# Patient Record
Sex: FEMALE | Race: WHITE | NOT HISPANIC OR LATINO | Employment: UNEMPLOYED | URBAN - METROPOLITAN AREA
[De-identification: names, ages, dates, MRNs, and addresses within clinical notes are randomized per-mention and may not be internally consistent; named-entity substitution may affect disease eponyms.]

---
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
## 2020-08-19 ENCOUNTER — OFFICE VISIT (OUTPATIENT)
Dept: OBSTETRICS AND GYNECOLOGY | Facility: CLINIC | Age: 25
End: 2020-08-19

## 2020-08-19 VITALS
HEIGHT: 64 IN | WEIGHT: 219 LBS | DIASTOLIC BLOOD PRESSURE: 70 MMHG | BODY MASS INDEX: 37.39 KG/M2 | SYSTOLIC BLOOD PRESSURE: 118 MMHG

## 2020-08-19 DIAGNOSIS — N91.2 AMENORRHEA: Primary | ICD-10-CM

## 2020-08-19 DIAGNOSIS — Z13.9 SCREENING FOR CONDITION: ICD-10-CM

## 2020-08-19 DIAGNOSIS — Z36.9 ENCOUNTER FOR ANTENATAL SCREENING, UNSPECIFIED: ICD-10-CM

## 2020-08-19 PROBLEM — Z98.891 PREVIOUS CESAREAN SECTION: Status: ACTIVE | Noted: 2020-08-19

## 2020-08-19 LAB
B-HCG UR QL: POSITIVE
INTERNAL NEGATIVE CONTROL: NEGATIVE
INTERNAL POSITIVE CONTROL: POSITIVE
Lab: ABNORMAL

## 2020-08-19 PROCEDURE — 81025 URINE PREGNANCY TEST: CPT | Performed by: OBSTETRICS & GYNECOLOGY

## 2020-08-19 PROCEDURE — 99213 OFFICE O/P EST LOW 20 MIN: CPT | Performed by: OBSTETRICS & GYNECOLOGY

## 2020-08-19 NOTE — PROGRESS NOTES
"      Syl Patel is a 24 y.o. patient who presents for follow up of   Chief Complaint   Patient presents with   • Amenorrhea       HPI 24-year-old  5 para 2-0-2-2 presents with 7-1/2 weeks of amenorrhea.  She had previously been on Depo-Provera and had amenorrhea but was concerned she was pregnant because she was going to try with her new .  She took multiple home pregnancy test that were positive.  She her associated symptoms include fatigue and tiredness.  She has mild nausea but no vomiting.  She is had cramping but no vaginal bleeding.    The following portions of the patient's history were reviewed and updated as appropriate: allergies, current medications and problem list.    Review of Systems   Constitutional: Positive for fatigue. Negative for appetite change, fever and unexpected weight change.   HENT: Negative for congestion and sore throat.    Respiratory: Negative for cough and shortness of breath.    Cardiovascular: Negative for chest pain and palpitations.   Gastrointestinal: Negative for abdominal distention, abdominal pain, constipation, diarrhea, nausea and vomiting.   Endocrine: Negative.    Genitourinary: Positive for menstrual problem. Negative for dyspareunia, pelvic pain and vaginal discharge.   Skin: Negative.    Neurological: Negative for dizziness and syncope.   Hematological: Negative.    Psychiatric/Behavioral: Negative for dysphoric mood and sleep disturbance. The patient is not nervous/anxious.        /70   Ht 162.6 cm (64\")   Wt 99.3 kg (219 lb)   LMP 2020   BMI 37.59 kg/m²     Physical Exam   Constitutional: She is oriented to person, place, and time. She appears well-developed and well-nourished.   HENT:   Head: Normocephalic and atraumatic.   Pulmonary/Chest: Effort normal. No respiratory distress.   Abdominal: Soft. She exhibits no distension and no mass. There is no tenderness. There is no rebound and no guarding.   Musculoskeletal: Normal range " of motion.   Neurological: She is alert and oriented to person, place, and time.   Skin: Skin is warm and dry.   Psychiatric: She has a normal mood and affect. Her behavior is normal. Judgment and thought content normal.   Nursing note and vitals reviewed.        Assessment/Plan    Syl was seen today for amenorrhea.    Diagnoses and all orders for this visit:    Amenorrhea    Screening for condition  -     POC Pregnancy, Urine    Urine pregnancy test is positive.  Estimated gestational age is 7 weeks and 4 days.  EDC is April 3, 2021.  Check pelvic ultrasound to confirm EDC and viability.  Draw prenatal labs today.    Return for US, TV, NOB.      Liam Dang MD  8/19/2020  15:56

## 2020-08-20 LAB
ABO GROUP BLD: NORMAL
BASOPHILS # BLD AUTO: 0.1 X10E3/UL (ref 0–0.2)
BASOPHILS NFR BLD AUTO: 1 %
BLD GP AB SCN SERPL QL: NEGATIVE
EOSINOPHIL # BLD AUTO: 0.3 X10E3/UL (ref 0–0.4)
EOSINOPHIL NFR BLD AUTO: 2 %
ERYTHROCYTE [DISTWIDTH] IN BLOOD BY AUTOMATED COUNT: 11.8 % (ref 11.7–15.4)
HBA1C MFR BLD: 5.1 % (ref 4.8–5.6)
HBV SURFACE AG SERPL QL IA: NEGATIVE
HCT VFR BLD AUTO: 41.2 % (ref 34–46.6)
HCV AB S/CO SERPL IA: <0.1 S/CO RATIO (ref 0–0.9)
HGB BLD-MCNC: 14 G/DL (ref 11.1–15.9)
HIV 1+2 AB+HIV1 P24 AG SERPL QL IA: NON REACTIVE
IMM GRANULOCYTES # BLD AUTO: 0.1 X10E3/UL (ref 0–0.1)
IMM GRANULOCYTES NFR BLD AUTO: 1 %
LYMPHOCYTES # BLD AUTO: 3.1 X10E3/UL (ref 0.7–3.1)
LYMPHOCYTES NFR BLD AUTO: 23 %
MCH RBC QN AUTO: 31.4 PG (ref 26.6–33)
MCHC RBC AUTO-ENTMCNC: 34 G/DL (ref 31.5–35.7)
MCV RBC AUTO: 92 FL (ref 79–97)
MONOCYTES # BLD AUTO: 0.6 X10E3/UL (ref 0.1–0.9)
MONOCYTES NFR BLD AUTO: 4 %
NEUTROPHILS # BLD AUTO: 9.5 X10E3/UL (ref 1.4–7)
NEUTROPHILS NFR BLD AUTO: 69 %
PLATELET # BLD AUTO: 316 X10E3/UL (ref 150–450)
RBC # BLD AUTO: 4.46 X10E6/UL (ref 3.77–5.28)
RH BLD: POSITIVE
RPR SER QL: NON REACTIVE
RUBV IGG SERPL IA-ACNC: 4.72 INDEX
VZV IGG SER IA-ACNC: 896 INDEX
WBC # BLD AUTO: 13.6 X10E3/UL (ref 3.4–10.8)

## 2020-08-21 LAB
BACTERIA UR CULT: NO GROWTH
BACTERIA UR CULT: NORMAL
C TRACH RRNA SPEC QL NAA+PROBE: NEGATIVE
N GONORRHOEA RRNA SPEC QL NAA+PROBE: NEGATIVE
T VAGINALIS DNA SPEC QL NAA+PROBE: NEGATIVE

## 2020-09-03 ENCOUNTER — INITIAL PRENATAL (OUTPATIENT)
Dept: OBSTETRICS AND GYNECOLOGY | Facility: CLINIC | Age: 25
End: 2020-09-03

## 2020-09-03 ENCOUNTER — PROCEDURE VISIT (OUTPATIENT)
Dept: OBSTETRICS AND GYNECOLOGY | Facility: CLINIC | Age: 25
End: 2020-09-03

## 2020-09-03 VITALS — WEIGHT: 222 LBS | DIASTOLIC BLOOD PRESSURE: 70 MMHG | SYSTOLIC BLOOD PRESSURE: 132 MMHG | BODY MASS INDEX: 38.11 KG/M2

## 2020-09-03 DIAGNOSIS — O21.9 NAUSEA AND VOMITING DURING PREGNANCY PRIOR TO 22 WEEKS GESTATION: ICD-10-CM

## 2020-09-03 DIAGNOSIS — Z34.91 INITIAL OBSTETRIC VISIT IN FIRST TRIMESTER: Primary | ICD-10-CM

## 2020-09-03 DIAGNOSIS — Z11.51 ENCOUNTER FOR SCREENING FOR HUMAN PAPILLOMAVIRUS (HPV): ICD-10-CM

## 2020-09-03 DIAGNOSIS — Z36.87 ENCOUNTER FOR ANTENATAL SCREENING FOR UNCERTAIN DATES: Primary | ICD-10-CM

## 2020-09-03 DIAGNOSIS — Z01.419 PAP SMEAR, LOW-RISK: ICD-10-CM

## 2020-09-03 DIAGNOSIS — Z98.891 PREVIOUS CESAREAN SECTION: ICD-10-CM

## 2020-09-03 LAB
GLUCOSE UR STRIP-MCNC: NEGATIVE MG/DL
PROT UR STRIP-MCNC: NEGATIVE MG/DL

## 2020-09-03 PROCEDURE — 99214 OFFICE O/P EST MOD 30 MIN: CPT | Performed by: NURSE PRACTITIONER

## 2020-09-03 PROCEDURE — 76817 TRANSVAGINAL US OBSTETRIC: CPT | Performed by: NURSE PRACTITIONER

## 2020-09-03 NOTE — PROGRESS NOTES
Initial ob visit     Chief Complaint   Patient presents with   • Initial Prenatal Visit       Syl Patel is being seen today for her first obstetrical visit.  She is a 24 y.o.    9w5d gestation. This problem is new to me, the examiner.     #: 1, Date: None, Sex: None, Weight: None, GA: None, Delivery: None, Apgar1: None, Apgar5: None, Living: None, Birth Comments: None    #: 2, Date: None, Sex: None, Weight: None, GA: None, Delivery: None, Apgar1: None, Apgar5: None, Living: None, Birth Comments: None    #: 3, Date: , Sex: Male, Weight: 3714 g (8 lb 3 oz), GA: 40w0d, Delivery: , Low Transverse, Apgar1: None, Apgar5: None, Living: Living, Birth Comments: None    #: 4, Date: , Sex: Male, Weight: 3600 g (7 lb 15 oz), GA: None, Delivery: , Low Transverse, Apgar1: None, Apgar5: None, Living: Living, Birth Comments: None    #: 5, Date: None, Sex: None, Weight: None, GA: None, Delivery: None, Apgar1: None, Apgar5: None, Living: None, Birth Comments: None      LNMP: 20  Confident with date: Yes  Taking prenatal vitamins: Yes  Planned pregnancy: Yes  Prior obstetric issues, potential pregnancy concerns:  x2  Family history of genetic issues (includes FOB): Nephew born with MCAD  Prior infections concerning in pregnancy (Rash, fever in last 2 weeks): denies  Varicella Hx: As child  Flu vaccine: Did not get last season  History of STDs: denies HSV  Current medications: PNV   Last pap smear: approx 1 year ago  Smoker: Yes  Drug or alcohol abuse: No  Prior testing for Cystic Fibrosis Carrier or Sickle Cell Trait- uncertain   Prepregnancy BMI: Body mass index is 38.11 kg/m².    History reviewed. No pertinent past medical history.    Past Surgical History:   Procedure Laterality Date   •  SECTION           Current Outpatient Medications:   •  Prenatal Vit-Fe Fumarate-FA (PRENATAL VITAMIN 27-0.8) 27-0.8 MG tablet tablet, Take  by mouth Daily., Disp: , Rfl:     No Known  Allergies    Social History     Socioeconomic History   • Marital status:      Spouse name: Not on file   • Number of children: Not on file   • Years of education: Not on file   • Highest education level: Not on file   Tobacco Use   • Smoking status: Current Every Day Smoker     Packs/day: 1.00   • Smokeless tobacco: Never Used   • Tobacco comment: 1-800 quit now    Substance and Sexual Activity   • Alcohol use: Never     Frequency: Never   • Sexual activity: Yes     Partners: Male       Family History   Problem Relation Age of Onset   • Stroke Father        Review of systems     All other systems reviewed and are negative except for: Constitutional: positive for fatigue  Gastrointestinal: positive for nausea and food eversion  Integument/breast: positive for breast tenderness     Objective    /70   Wt 101 kg (222 lb)   LMP 06/27/2020   BMI 38.11 kg/m²       General Appearance:    Alert, cooperative, in no acute distress, habitus overweight    Head:    Not examined   Eyes:           Not examined   Ears:  Not examined       Neck:  No thyroid enlargement or nodules present   Back:     No kyphosis present, no scoliosis present,                       Lungs:     Clear to auscultation,respirations regular, even and                   unlabored    Heart:    Regular rhythm and normal rate, normal S1 and S2, no            murmur, no gallop, no rub, no click   Breast Exam:    No masses, No nipple discharge   Abdomen:     Normal bowel sounds, no masses, no organomegaly, soft        non-tender, non-distended, no guarding, no rebound                 tenderness   Genitalia:    Vulva - No masses, no atrophy, no lesions    Vagina - No discharge, No bleeding    Cervix - No Lesions, closed. Pap collected:Yes      Uterus - Consistent with 9 weeks.     Adnexa - No masses, non tender       Extremities:   Moves all extremities well, no edema, no cyanosis, no              redness       Skin:   No bleeding, bruising or rash    Lymph nodes:   No palpable adenopathy   Neurologic:   Sensation intact, A&O times 3      Assessment/Plan    1) Pregnancy at 9w5d- US IMP: Single, viable IUP @ 9.0 weeks. US findings discussed with patient. EDC established 4/3/21 and confirmed by US and LNMP.     2) OB exam: OB exam completed: Yes. New OB bag provided Yes. Pap collected: Yes.    3) Labs: OB labs collected: Yes Counseled on genetic screening: Yes, she desires CF, SMA and Fragile X at her next appt. Counseled on Quad screen and AFP: Yes, she is too early for AFP. Counseled on NIPS: Yes, she desires NIPS at her next appt.      4) Patient's Body mass index is 38.11 kg/m². BMI is above normal parameters. Recommendations include: exercise counseling and nutrition counseling. Obese women with a pre-pregnancy BMI of 30+ should strive to gain approx 11-20 pounds for the entire pregnancy.      5)  Prenatal care: Oriented to the office and prenatal care. Encourage prenatal vitamins. Disc Tylenol products are fine, avoid aspirin and ibuprofen; Zika (travel restrictions/ok to use insect repellant); not to change cat litter; food restrictions; exercise;  avoidance of alcohol, tobacco, drugs and saunas/hot tubs.     6) Smoker- During this visit, approx 3-5 minutes counseling the patient regarding smoking cessation. PT COUNSELED TO QUIT SMOKING IN PREGNANCY.  She has been informed that cigarette smoking is associated with increased incidence of  birth, growth restriction, SIDS, et. Disc that smoking cessation is the single most important thing she can do to improve the pregnancy outcome.  She verbalized understanding to this discussion.      7) Family h/o MCAD- Check [employer] expanded carrier screening at next appt     8) Previous  x 2- Plan repeat @ 39 weeks     9) N/V- Enc small frequent meals. ERX sent for Vit B6 and Unisom.     All questions answered.     RTO 4 weeks     Trish Sahu, ROSS  9/3/2020  14:35

## 2020-09-07 LAB
C TRACH RRNA CVX QL NAA+PROBE: NEGATIVE
CONV .: NORMAL
CYTOLOGIST CVX/VAG CYTO: NORMAL
CYTOLOGY CVX/VAG DOC CYTO: NORMAL
CYTOLOGY CVX/VAG DOC THIN PREP: NORMAL
DX ICD CODE: NORMAL
HIV 1 & 2 AB SER-IMP: NORMAL
N GONORRHOEA RRNA CVX QL NAA+PROBE: NEGATIVE
OTHER STN SPEC: NORMAL
STAT OF ADQ CVX/VAG CYTO-IMP: NORMAL
T VAGINALIS RRNA SPEC QL NAA+PROBE: NEGATIVE

## 2020-09-15 PROBLEM — Z34.91 INITIAL OBSTETRIC VISIT IN FIRST TRIMESTER: Status: ACTIVE | Noted: 2020-09-15

## 2020-09-15 PROBLEM — O21.9 NAUSEA AND VOMITING DURING PREGNANCY PRIOR TO 22 WEEKS GESTATION: Status: ACTIVE | Noted: 2020-09-15

## 2020-09-15 PROBLEM — Z11.51 ENCOUNTER FOR SCREENING FOR HUMAN PAPILLOMAVIRUS (HPV): Status: ACTIVE | Noted: 2020-09-15

## 2020-09-28 ENCOUNTER — ROUTINE PRENATAL (OUTPATIENT)
Dept: OBSTETRICS AND GYNECOLOGY | Facility: CLINIC | Age: 25
End: 2020-09-28

## 2020-09-28 ENCOUNTER — APPOINTMENT (OUTPATIENT)
Dept: GENERAL RADIOLOGY | Facility: HOSPITAL | Age: 25
End: 2020-09-28

## 2020-09-28 ENCOUNTER — HOSPITAL ENCOUNTER (EMERGENCY)
Facility: HOSPITAL | Age: 25
Discharge: HOME | End: 2020-09-28
Attending: EMERGENCY MEDICINE | Admitting: EMERGENCY MEDICINE

## 2020-09-28 VITALS
TEMPERATURE: 97.9 F | BODY MASS INDEX: 35.52 KG/M2 | HEART RATE: 99 BPM | OXYGEN SATURATION: 100 % | DIASTOLIC BLOOD PRESSURE: 68 MMHG | WEIGHT: 221 LBS | SYSTOLIC BLOOD PRESSURE: 113 MMHG | RESPIRATION RATE: 16 BRPM | HEIGHT: 66 IN

## 2020-09-28 VITALS — BODY MASS INDEX: 37.93 KG/M2 | WEIGHT: 221 LBS | SYSTOLIC BLOOD PRESSURE: 112 MMHG | DIASTOLIC BLOOD PRESSURE: 70 MMHG

## 2020-09-28 DIAGNOSIS — Z36.9 ENCOUNTER FOR ANTENATAL SCREENING, UNSPECIFIED: Primary | ICD-10-CM

## 2020-09-28 DIAGNOSIS — E71.311 MCAD (MEDIUM-CHAIN ACYL-COA DEHYDROGENASE DEFICIENCY) (HCC): ICD-10-CM

## 2020-09-28 DIAGNOSIS — Z98.891 PREVIOUS CESAREAN SECTION: ICD-10-CM

## 2020-09-28 DIAGNOSIS — Z34.90 PREGNANCY, UNSPECIFIED GESTATIONAL AGE: ICD-10-CM
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
## 2020-09-28 DIAGNOSIS — O21.9 NAUSEA AND VOMITING DURING PREGNANCY PRIOR TO 22 WEEKS GESTATION: ICD-10-CM

## 2020-09-28 DIAGNOSIS — S61.412A LACERATION OF LEFT HAND WITHOUT FOREIGN BODY, INITIAL ENCOUNTER: ICD-10-CM

## 2020-09-28 DIAGNOSIS — F17.200 SMOKER: ICD-10-CM

## 2020-09-28 PROBLEM — Z11.51 ENCOUNTER FOR SCREENING FOR HUMAN PAPILLOMAVIRUS (HPV): Status: RESOLVED | Noted: 2020-09-15 | Resolved: 2020-09-28

## 2020-09-28 PROBLEM — Z34.91 INITIAL OBSTETRIC VISIT IN FIRST TRIMESTER: Status: RESOLVED | Noted: 2020-09-15 | Resolved: 2020-09-28

## 2020-09-28 LAB
GLUCOSE UR STRIP-MCNC: NEGATIVE MG/DL
PROT UR STRIP-MCNC: NEGATIVE MG/DL

## 2020-09-28 PROCEDURE — 90715 TDAP VACCINE 7 YRS/> IM: CPT

## 2020-09-28 PROCEDURE — 25010000002 TDAP 5-2.5-18.5 LF-MCG/0.5 SUSPENSION

## 2020-09-28 PROCEDURE — 99214 OFFICE O/P EST MOD 30 MIN: CPT | Performed by: OBSTETRICS & GYNECOLOGY

## 2020-09-28 PROCEDURE — 73130 X-RAY EXAM OF HAND: CPT

## 2020-09-28 PROCEDURE — 99283 EMERGENCY DEPT VISIT LOW MDM: CPT

## 2020-09-28 PROCEDURE — 12001 RPR S/N/AX/GEN/TRNK 2.5CM/<: CPT | Performed by: EMERGENCY MEDICINE

## 2020-09-28 PROCEDURE — 90471 IMMUNIZATION ADMIN: CPT

## 2020-09-28 PROCEDURE — 99282 EMERGENCY DEPT VISIT SF MDM: CPT | Performed by: EMERGENCY MEDICINE

## 2020-09-28 RX ORDER — CEPHALEXIN 500 MG/1
500 CAPSULE ORAL 3 TIMES DAILY
Qty: 15 CAPSULE | Refills: 0 | Status: ON HOLD | OUTPATIENT
Start: 2020-09-28 | End: 2020-10-28

## 2020-09-28 RX ADMIN — TETANUS TOXOID, REDUCED DIPHTHERIA TOXOID AND ACELLULAR PERTUSSIS VACCINE, ADSORBED 0.5 ML: 5; 2.5; 8; 8; 2.5 SUSPENSION INTRAMUSCULAR at 13:28

## 2020-09-28 NOTE — PROGRESS NOTES
"OB follow up     Syl Patel is a 24 y.o.  13w2d being seen today for her obstetrical visit.  Patient reports some intermittent nausea. Fetal movement: \"flutters\".    Review of Systems  No bleeding, No cramping/contractions     /70   Wt 100 kg (221 lb)   LMP 2020   BMI 37.93 kg/m²     FHT: 145 BPM   Uterine Size: 15  cm       Assessment/Plan:    1) 24 y.o.  -pregnancy at 13w2d- check NIPT per pt request.     2) Family history of MCAD (medium-chain acetyl COA dehydrogenase)- called [employer] and ACADM gene added to 46 gene carrier screening panel.     3) Smoker- Patient was counseled for 3  minutes regarding effects of smoking in pregnancy, including risk of poor growth,  birth and stillbirth as well as an increase in asthma and ear infections in infancy.  Patient did voice a willingness to quit and information was given for review. She is currently smoking 1/2 PPD of cigarrettes.  She is unwilling to set a quit date but says that she will try to smoke less    4) C/S x 2- plan RTLCS at 39 weeks    5) N/V- improving, pt declines meds    6) COVID precautions given. I saw the patient with a face mask, gloves and eye protection  The patient herself was masked.  Social distancing was observed as appropriate. She is at home with her other children.    7) Check UDS ( not done last time)    8)Reviewed this stage of pregnancy    9)Problem list updated     10) RTO 4 weeks OBT & AFP and 7 weeks for US anatomy and OBT.       Saritha Richardson MD    2020  11:15 EDT          "

## 2020-09-29 LAB
AMPHETAMINES UR QL SCN: NEGATIVE NG/ML
BARBITURATES UR QL SCN: NEGATIVE NG/ML
BENZODIAZ UR QL SCN: NEGATIVE NG/ML
BZE UR QL SCN: NEGATIVE NG/ML
CANNABINOIDS UR QL SCN: NEGATIVE NG/ML
CREAT UR-MCNC: 23.5 MG/DL (ref 20–300)
LABORATORY COMMENT REPORT: NORMAL
METHADONE UR QL SCN: NEGATIVE NG/ML
OPIATES UR QL SCN: NEGATIVE NG/ML
OXYCODONE+OXYMORPHONE UR QL SCN: NEGATIVE NG/ML
PCP UR QL: NEGATIVE NG/ML
PH UR: 6 [PH] (ref 4.5–8.9)
PROPOXYPH UR QL SCN: NEGATIVE NG/ML

## 2020-09-30 ENCOUNTER — ROUTINE PRENATAL (OUTPATIENT)
Dept: OBSTETRICS AND GYNECOLOGY | Facility: CLINIC | Age: 25
End: 2020-09-30

## 2020-09-30 ENCOUNTER — PROCEDURE VISIT (OUTPATIENT)
Dept: OBSTETRICS AND GYNECOLOGY | Facility: CLINIC | Age: 25
End: 2020-09-30

## 2020-09-30 VITALS — SYSTOLIC BLOOD PRESSURE: 110 MMHG | DIASTOLIC BLOOD PRESSURE: 70 MMHG | WEIGHT: 223.7 LBS | BODY MASS INDEX: 36.11 KG/M2

## 2020-09-30 DIAGNOSIS — O35.8XX0 CYSTIC HYGROMA OF FETUS IN SINGLETON PREGNANCY: ICD-10-CM
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
## 2020-09-30 DIAGNOSIS — E71.311 MCAD (MEDIUM-CHAIN ACYL-COA DEHYDROGENASE DEFICIENCY) (HCC): ICD-10-CM

## 2020-09-30 DIAGNOSIS — F17.200 SMOKER: ICD-10-CM

## 2020-09-30 DIAGNOSIS — O36.80X0 ENCOUNTER TO DETERMINE FETAL VIABILITY OF PREGNANCY, SINGLE OR UNSPECIFIED FETUS: ICD-10-CM

## 2020-09-30 DIAGNOSIS — O21.9 NAUSEA AND VOMITING DURING PREGNANCY PRIOR TO 22 WEEKS GESTATION: ICD-10-CM

## 2020-09-30 DIAGNOSIS — Z98.891 PREVIOUS CESAREAN SECTION: Primary | ICD-10-CM

## 2020-09-30 DIAGNOSIS — O28.3 ABNORMAL ULTRASONIC FINDING ON ANTENATAL SCREENING OF MOTHER: Primary | ICD-10-CM

## 2020-09-30 LAB
GLUCOSE UR STRIP-MCNC: NEGATIVE MG/DL
PROT UR STRIP-MCNC: NEGATIVE MG/DL

## 2020-09-30 PROCEDURE — 76801 OB US < 14 WKS SINGLE FETUS: CPT | Performed by: OBSTETRICS & GYNECOLOGY

## 2020-09-30 PROCEDURE — 99214 OFFICE O/P EST MOD 30 MIN: CPT | Performed by: OBSTETRICS & GYNECOLOGY
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
## 2020-10-02 LAB — EXTERNAL NIPT: NORMAL

## 2020-10-06 ENCOUNTER — HOSPITAL ENCOUNTER (OUTPATIENT)
Dept: ULTRASOUND IMAGING | Facility: HOSPITAL | Age: 25
Discharge: HOME | End: 2020-10-06
Admitting: OBSTETRICS & GYNECOLOGY

## 2020-10-06 ENCOUNTER — CLINICAL SUPPORT (OUTPATIENT)
Dept: OBSTETRICS AND GYNECOLOGY | Facility: CLINIC | Age: 25
End: 2020-10-06

## 2020-10-06 VITALS
TEMPERATURE: 96.9 F | HEART RATE: 102 BPM | SYSTOLIC BLOOD PRESSURE: 116 MMHG | WEIGHT: 223 LBS | HEIGHT: 64 IN | DIASTOLIC BLOOD PRESSURE: 70 MMHG | BODY MASS INDEX: 38.07 KG/M2

## 2020-10-06 DIAGNOSIS — O28.5 ABNORMAL GENETIC TEST DURING PREGNANCY: ICD-10-CM

## 2020-10-06 DIAGNOSIS — O35.8XX0 CYSTIC HYGROMA OF FETUS IN SINGLETON PREGNANCY: ICD-10-CM

## 2020-10-06 DIAGNOSIS — Z3A.14 14 WEEKS GESTATION OF PREGNANCY: ICD-10-CM

## 2020-10-06 DIAGNOSIS — O35.8XX0 CYSTIC HYGROMA OF FETUS IN SINGLETON PREGNANCY: Primary | ICD-10-CM

## 2020-10-06 PROCEDURE — 76805 OB US >/= 14 WKS SNGL FETUS: CPT

## 2020-10-06 PROCEDURE — 76801 OB US < 14 WKS SINGLE FETUS: CPT

## 2020-10-07 ENCOUNTER — TRANSCRIBE ORDERS (OUTPATIENT)
Dept: ULTRASOUND IMAGING | Facility: HOSPITAL | Age: 25
End: 2020-10-07

## 2020-10-07 DIAGNOSIS — O35.8XX0 CYSTIC HYGROMA OF FETUS IN SINGLETON PREGNANCY: Primary | ICD-10-CM

## 2020-10-07 NOTE — PROGRESS NOTES
10/7/2020-I called and spoke with patient about positive NIPT for Down's.  She saw maternal-fetal medicine yesterday for cystic hygroma and has an amniocentesis scheduled for 10/27/2020.
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
## 2020-10-09 NOTE — PROGRESS NOTES
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  Father the baby needs to be tested for this gene to determine the risk for this child.  Please arrange

## 2020-10-26 ENCOUNTER — TRANSCRIBE ORDERS (OUTPATIENT)
Dept: ADMINISTRATIVE | Facility: HOSPITAL | Age: 25
End: 2020-10-26

## 2020-10-26 ENCOUNTER — PROCEDURE VISIT (OUTPATIENT)
Dept: OBSTETRICS AND GYNECOLOGY | Facility: CLINIC | Age: 25
End: 2020-10-26

## 2020-10-26 ENCOUNTER — ROUTINE PRENATAL (OUTPATIENT)
Dept: OBSTETRICS AND GYNECOLOGY | Facility: CLINIC | Age: 25
End: 2020-10-26

## 2020-10-26 ENCOUNTER — LAB (OUTPATIENT)
Dept: LAB | Facility: HOSPITAL | Age: 25
End: 2020-10-26

## 2020-10-26 VITALS — WEIGHT: 225 LBS | SYSTOLIC BLOOD PRESSURE: 110 MMHG | DIASTOLIC BLOOD PRESSURE: 74 MMHG | BODY MASS INDEX: 38.62 KG/M2

## 2020-10-26 DIAGNOSIS — Z98.891 PREVIOUS CESAREAN SECTION: ICD-10-CM

## 2020-10-26 DIAGNOSIS — O35.8XX0 CYSTIC HYGROMA OF FETUS IN SINGLETON PREGNANCY: ICD-10-CM

## 2020-10-26 DIAGNOSIS — O36.80X0 ENCOUNTER TO DETERMINE FETAL VIABILITY OF PREGNANCY, SINGLE OR UNSPECIFIED FETUS: Primary | ICD-10-CM

## 2020-10-26 DIAGNOSIS — Z01.818 OTHER SPECIFIED PRE-OPERATIVE EXAMINATION: ICD-10-CM

## 2020-10-26 DIAGNOSIS — O02.1 IUFD AT LESS THAN 20 WEEKS OF GESTATION: Primary | ICD-10-CM

## 2020-10-26 DIAGNOSIS — Z01.818 OTHER SPECIFIED PRE-OPERATIVE EXAMINATION: Primary | ICD-10-CM

## 2020-10-26 DIAGNOSIS — Z36.9 ENCOUNTER FOR ANTENATAL SCREENING, UNSPECIFIED: ICD-10-CM

## 2020-10-26 LAB
GLUCOSE UR STRIP-MCNC: NEGATIVE MG/DL
PROT UR STRIP-MCNC: NEGATIVE MG/DL

## 2020-10-26 PROCEDURE — C9803 HOPD COVID-19 SPEC COLLECT: HCPCS

## 2020-10-26 PROCEDURE — 76816 OB US FOLLOW-UP PER FETUS: CPT | Performed by: OBSTETRICS & GYNECOLOGY

## 2020-10-26 PROCEDURE — U0004 COV-19 TEST NON-CDC HGH THRU: HCPCS | Performed by: OBSTETRICS & GYNECOLOGY

## 2020-10-26 PROCEDURE — 99214 OFFICE O/P EST MOD 30 MIN: CPT | Performed by: OBSTETRICS & GYNECOLOGY

## 2020-10-26 NOTE — PROGRESS NOTES
OB follow up     Chief Complaint: PNC FU    Syl Patel is a 24 y.o.  17w2d being seen today for her obstetrical visit. This is the first time I am seeing this patient in this pregnancy. She is having a complicated pregnancy. The baby has Trisomy 21 and large cystic hygroma. Pt has seen MFM and pt is scheduled for an amniocentesis this week. Patient has no complaints. She accepts AFP today. Pt has hx of 2 prior C sections.    Review of Systems  No bleeding, No cramping/contractions     /74   Wt 102 kg (225 lb)   LMP 2020   BMI 38.62 kg/m²     FHT:   BPM   Uterine Size:           Assessment/Plan: Low risk pregnancy    1) pregnancy at 17w2d: IUFD diagnosed today.    2) Trisomy 21 and large cystic hygroma: pt has seen MFM and is scheduled for amniocentesis tomorrow.     3) Flu vaccine today    4) Tobacco smoker: Pt quit smoking. She is using patches prn    5) IUFD: US confirms IUFD today. Pt appropriately upset. FOB and pt counseled as to options of Cytotec for IOL versus D and E. Pt and FOB would like IOL as they want to see the baby. Will plan for admission for IOL.    6) Prior C section x 2: . Will discuss with MFM the best options for IOL with hx of prior C section x2.    Reviewed this stage of pregnancy  Problem list updated   No follow-ups on file.      Taryn Viveros,     10/26/2020  11:40 EDT

## 2020-10-27 ENCOUNTER — APPOINTMENT (OUTPATIENT)
Dept: ULTRASOUND IMAGING | Facility: HOSPITAL | Age: 25
End: 2020-10-27

## 2020-10-27 LAB — SARS-COV-2 RNA RESP QL NAA+PROBE: NOT DETECTED

## 2020-10-28 ENCOUNTER — HOSPITAL ENCOUNTER (INPATIENT)
Facility: HOSPITAL | Age: 25
LOS: 1 days | Discharge: HOME | End: 2020-10-29
Attending: OBSTETRICS & GYNECOLOGY | Admitting: OBSTETRICS & GYNECOLOGY

## 2020-10-28 ENCOUNTER — ANESTHESIA EVENT (OUTPATIENT)
Dept: PERIOP | Facility: HOSPITAL | Age: 25
End: 2020-10-28

## 2020-10-28 ENCOUNTER — HOSPITAL ENCOUNTER (OUTPATIENT)
Dept: LABOR AND DELIVERY | Facility: HOSPITAL | Age: 25
Discharge: HOME | End: 2020-10-28

## 2020-10-28 ENCOUNTER — ANESTHESIA (OUTPATIENT)
Dept: PERIOP | Facility: HOSPITAL | Age: 25
End: 2020-10-28

## 2020-10-28 DIAGNOSIS — Z98.890 S/P D&C (STATUS POST DILATION AND CURETTAGE): ICD-10-CM

## 2020-10-28 DIAGNOSIS — O02.1 IUFD AT LESS THAN 20 WEEKS OF GESTATION: ICD-10-CM

## 2020-10-28 LAB
ABO GROUP BLD: NORMAL
ABO GROUP BLD: NORMAL
AMPHET+METHAMPHET UR QL: NEGATIVE
AMPHETAMINES UR QL: NEGATIVE
BARBITURATES UR QL SCN: NEGATIVE
BENZODIAZ UR QL SCN: NEGATIVE
BLD GP AB SCN SERPL QL: NEGATIVE
BUPRENORPHINE SERPL-MCNC: NEGATIVE NG/ML
CANNABINOIDS SERPL QL: NEGATIVE
COCAINE UR QL: NEGATIVE
DEPRECATED RDW RBC AUTO: 42.4 FL (ref 37–54)
ERYTHROCYTE [DISTWIDTH] IN BLOOD BY AUTOMATED COUNT: 12.1 % (ref 12.3–15.4)
FIBRINOGEN PPP-MCNC: 360 MG/DL (ref 200–400)
HCT VFR BLD AUTO: 35 % (ref 34–46.6)
HCT VFR BLD AUTO: 37.8 % (ref 34–46.6)
HGB BLD-MCNC: 11.6 G/DL (ref 12–15.9)
HGB BLD-MCNC: 12.6 G/DL (ref 12–15.9)
MCH RBC QN AUTO: 31.8 PG (ref 26.6–33)
MCHC RBC AUTO-ENTMCNC: 33.3 G/DL (ref 31.5–35.7)
MCV RBC AUTO: 95.5 FL (ref 79–97)
METHADONE UR QL SCN: NEGATIVE
OPIATES UR QL: NEGATIVE
OXYCODONE UR QL SCN: NEGATIVE
PCP UR QL SCN: NEGATIVE
PLATELET # BLD AUTO: 270 10*3/MM3 (ref 140–450)
PMV BLD AUTO: 10.8 FL (ref 6–12)
PROPOXYPH UR QL: NEGATIVE
RBC # BLD AUTO: 3.96 10*6/MM3 (ref 3.77–5.28)
RH BLD: POSITIVE
RH BLD: POSITIVE
T&S EXPIRATION DATE: NORMAL
TRICYCLICS UR QL SCN: NEGATIVE
WBC # BLD AUTO: 13.25 10*3/MM3 (ref 3.4–10.8)

## 2020-10-28 PROCEDURE — 86900 BLOOD TYPING SEROLOGIC ABO: CPT | Performed by: OBSTETRICS & GYNECOLOGY

## 2020-10-28 PROCEDURE — 86901 BLOOD TYPING SEROLOGIC RH(D): CPT | Performed by: OBSTETRICS & GYNECOLOGY

## 2020-10-28 PROCEDURE — 25010000002 MIDAZOLAM PER 1MG: Performed by: NURSE ANESTHETIST, CERTIFIED REGISTERED

## 2020-10-28 PROCEDURE — S0260 H&P FOR SURGERY: HCPCS | Performed by: OBSTETRICS & GYNECOLOGY

## 2020-10-28 PROCEDURE — 25010000002 FENTANYL CITRATE (PF) 100 MCG/2ML SOLUTION: Performed by: NURSE ANESTHETIST, CERTIFIED REGISTERED

## 2020-10-28 PROCEDURE — 86923 COMPATIBILITY TEST ELECTRIC: CPT

## 2020-10-28 PROCEDURE — 85014 HEMATOCRIT: CPT | Performed by: OBSTETRICS & GYNECOLOGY

## 2020-10-28 PROCEDURE — 25010000002 CEFOXITIN PER 1 G: Performed by: OBSTETRICS & GYNECOLOGY

## 2020-10-28 PROCEDURE — 25010000002 PROPOFOL 10 MG/ML EMULSION: Performed by: NURSE ANESTHETIST, CERTIFIED REGISTERED

## 2020-10-28 PROCEDURE — 59856 INDUCED AB 1+VAG SUPP D&C: CPT | Performed by: OBSTETRICS & GYNECOLOGY

## 2020-10-28 PROCEDURE — 85384 FIBRINOGEN ACTIVITY: CPT | Performed by: OBSTETRICS & GYNECOLOGY

## 2020-10-28 PROCEDURE — 25010000002 ONDANSETRON PER 1 MG: Performed by: NURSE ANESTHETIST, CERTIFIED REGISTERED

## 2020-10-28 PROCEDURE — 86850 RBC ANTIBODY SCREEN: CPT | Performed by: OBSTETRICS & GYNECOLOGY

## 2020-10-28 PROCEDURE — 85027 COMPLETE CBC AUTOMATED: CPT | Performed by: OBSTETRICS & GYNECOLOGY

## 2020-10-28 PROCEDURE — 25010000002 DEXAMETHASONE PER 1 MG: Performed by: NURSE ANESTHETIST, CERTIFIED REGISTERED

## 2020-10-28 PROCEDURE — 10D17Z9 MANUAL EXTRACTION OF PRODUCTS OF CONCEPTION, RETAINED, VIA NATURAL OR ARTIFICIAL OPENING: ICD-10-PCS | Performed by: OBSTETRICS & GYNECOLOGY

## 2020-10-28 PROCEDURE — 3E033VJ INTRODUCTION OF OTHER HORMONE INTO PERIPHERAL VEIN, PERCUTANEOUS APPROACH: ICD-10-PCS | Performed by: OBSTETRICS & GYNECOLOGY

## 2020-10-28 PROCEDURE — 86900 BLOOD TYPING SEROLOGIC ABO: CPT

## 2020-10-28 PROCEDURE — 25010000002 PHENYLEPHRINE PER 1 ML: Performed by: NURSE ANESTHETIST, CERTIFIED REGISTERED

## 2020-10-28 PROCEDURE — 80306 DRUG TEST PRSMV INSTRMNT: CPT | Performed by: OBSTETRICS & GYNECOLOGY

## 2020-10-28 PROCEDURE — 88305 TISSUE EXAM BY PATHOLOGIST: CPT | Performed by: OBSTETRICS & GYNECOLOGY

## 2020-10-28 PROCEDURE — 86901 BLOOD TYPING SEROLOGIC RH(D): CPT

## 2020-10-28 PROCEDURE — 25010000002 SUCCINYLCHOLINE PER 20 MG: Performed by: NURSE ANESTHETIST, CERTIFIED REGISTERED

## 2020-10-28 PROCEDURE — 85018 HEMOGLOBIN: CPT | Performed by: OBSTETRICS & GYNECOLOGY

## 2020-10-28 PROCEDURE — 25010000002 METHYLERGONOVINE MALEATE PER 0.2 MG: Performed by: NURSE ANESTHETIST, CERTIFIED REGISTERED

## 2020-10-28 RX ORDER — PROPOFOL 10 MG/ML
VIAL (ML) INTRAVENOUS AS NEEDED
Status: DISCONTINUED | OUTPATIENT
Start: 2020-10-28 | End: 2020-10-28 | Stop reason: SURG

## 2020-10-28 RX ORDER — SUCCINYLCHOLINE CHLORIDE 20 MG/ML
INJECTION INTRAMUSCULAR; INTRAVENOUS AS NEEDED
Status: DISCONTINUED | OUTPATIENT
Start: 2020-10-28 | End: 2020-10-28 | Stop reason: SURG

## 2020-10-28 RX ORDER — MIDAZOLAM HYDROCHLORIDE 2 MG/2ML
1 INJECTION, SOLUTION INTRAMUSCULAR; INTRAVENOUS
Status: DISCONTINUED | OUTPATIENT
Start: 2020-10-28 | End: 2020-10-28 | Stop reason: HOSPADM

## 2020-10-28 RX ORDER — SODIUM CHLORIDE 9 MG/ML
40 INJECTION, SOLUTION INTRAVENOUS AS NEEDED
Status: DISCONTINUED | OUTPATIENT
Start: 2020-10-28 | End: 2020-10-28 | Stop reason: HOSPADM

## 2020-10-28 RX ORDER — MISOPROSTOL 100 UG/1
TABLET ORAL AS NEEDED
Status: DISCONTINUED | OUTPATIENT
Start: 2020-10-28 | End: 2020-10-28 | Stop reason: HOSPADM

## 2020-10-28 RX ORDER — METHYLERGONOVINE MALEATE 0.2 MG/ML
INJECTION INTRAVENOUS AS NEEDED
Status: DISCONTINUED | OUTPATIENT
Start: 2020-10-28 | End: 2020-10-28 | Stop reason: SURG

## 2020-10-28 RX ORDER — PRENATAL VIT/IRON FUM/FOLIC AC 27MG-0.8MG
1 TABLET ORAL DAILY
Status: DISCONTINUED | OUTPATIENT
Start: 2020-10-29 | End: 2020-10-29 | Stop reason: HOSPADM

## 2020-10-28 RX ORDER — ACETAMINOPHEN 500 MG
1000 TABLET ORAL ONCE
Status: DISCONTINUED | OUTPATIENT
Start: 2020-10-28 | End: 2020-10-28 | Stop reason: HOSPADM

## 2020-10-28 RX ORDER — LIDOCAINE HYDROCHLORIDE 20 MG/ML
INJECTION, SOLUTION INFILTRATION; PERINEURAL AS NEEDED
Status: DISCONTINUED | OUTPATIENT
Start: 2020-10-28 | End: 2020-10-28 | Stop reason: SURG

## 2020-10-28 RX ORDER — OXYTOCIN/0.9 % SODIUM CHLORIDE 30/500 ML
PLASTIC BAG, INJECTION (ML) INTRAVENOUS
Status: COMPLETED
Start: 2020-10-28 | End: 2020-10-28

## 2020-10-28 RX ORDER — FAMOTIDINE 20 MG/1
20 TABLET, FILM COATED ORAL ONCE AS NEEDED
Status: DISCONTINUED | OUTPATIENT
Start: 2020-10-28 | End: 2020-10-29 | Stop reason: HOSPADM

## 2020-10-28 RX ORDER — SODIUM CHLORIDE 0.9 % (FLUSH) 0.9 %
1-10 SYRINGE (ML) INJECTION AS NEEDED
Status: DISCONTINUED | OUTPATIENT
Start: 2020-10-28 | End: 2020-10-29 | Stop reason: HOSPADM

## 2020-10-28 RX ORDER — LANOLIN 100 %
OINTMENT (GRAM) TOPICAL
Status: DISCONTINUED | OUTPATIENT
Start: 2020-10-28 | End: 2020-10-29

## 2020-10-28 RX ORDER — TERBUTALINE SULFATE 1 MG/ML
0.25 INJECTION, SOLUTION SUBCUTANEOUS AS NEEDED
Status: DISCONTINUED | OUTPATIENT
Start: 2020-10-28 | End: 2020-10-29

## 2020-10-28 RX ORDER — FAMOTIDINE 10 MG/ML
20 INJECTION, SOLUTION INTRAVENOUS
Status: DISCONTINUED | OUTPATIENT
Start: 2020-10-28 | End: 2020-10-28 | Stop reason: HOSPADM

## 2020-10-28 RX ORDER — IBUPROFEN 800 MG/1
800 TABLET ORAL EVERY 8 HOURS PRN
Status: DISCONTINUED | OUTPATIENT
Start: 2020-10-28 | End: 2020-10-29 | Stop reason: HOSPADM

## 2020-10-28 RX ORDER — DEXAMETHASONE SODIUM PHOSPHATE 4 MG/ML
4 INJECTION, SOLUTION INTRA-ARTICULAR; INTRALESIONAL; INTRAMUSCULAR; INTRAVENOUS; SOFT TISSUE ONCE AS NEEDED
Status: COMPLETED | OUTPATIENT
Start: 2020-10-28 | End: 2020-10-28

## 2020-10-28 RX ORDER — ONDANSETRON 2 MG/ML
4 INJECTION INTRAMUSCULAR; INTRAVENOUS ONCE AS NEEDED
Status: DISCONTINUED | OUTPATIENT
Start: 2020-10-28 | End: 2020-10-28 | Stop reason: HOSPADM

## 2020-10-28 RX ORDER — HYDROMORPHONE HYDROCHLORIDE 1 MG/ML
0.25 INJECTION, SOLUTION INTRAMUSCULAR; INTRAVENOUS; SUBCUTANEOUS
Status: DISCONTINUED | OUTPATIENT
Start: 2020-10-28 | End: 2020-10-28 | Stop reason: HOSPADM

## 2020-10-28 RX ORDER — FENTANYL CITRATE 50 UG/ML
INJECTION, SOLUTION INTRAMUSCULAR; INTRAVENOUS AS NEEDED
Status: DISCONTINUED | OUTPATIENT
Start: 2020-10-28 | End: 2020-10-28 | Stop reason: SURG

## 2020-10-28 RX ORDER — LIDOCAINE HYDROCHLORIDE 10 MG/ML
0.5 INJECTION, SOLUTION EPIDURAL; INFILTRATION; INTRACAUDAL; PERINEURAL ONCE AS NEEDED
Status: DISCONTINUED | OUTPATIENT
Start: 2020-10-28 | End: 2020-10-28 | Stop reason: HOSPADM

## 2020-10-28 RX ORDER — SODIUM CHLORIDE 0.9 % (FLUSH) 0.9 %
3 SYRINGE (ML) INJECTION EVERY 12 HOURS SCHEDULED
Status: DISCONTINUED | OUTPATIENT
Start: 2020-10-28 | End: 2020-10-28 | Stop reason: HOSPADM

## 2020-10-28 RX ORDER — ONDANSETRON 4 MG/1
4 TABLET, FILM COATED ORAL EVERY 6 HOURS PRN
Status: DISCONTINUED | OUTPATIENT
Start: 2020-10-28 | End: 2020-10-29 | Stop reason: HOSPADM

## 2020-10-28 RX ORDER — HYDROMORPHONE HYDROCHLORIDE 1 MG/ML
0.5 INJECTION, SOLUTION INTRAMUSCULAR; INTRAVENOUS; SUBCUTANEOUS
Status: DISCONTINUED | OUTPATIENT
Start: 2020-10-28 | End: 2020-10-28 | Stop reason: HOSPADM

## 2020-10-28 RX ORDER — MAGNESIUM HYDROXIDE 1200 MG/15ML
LIQUID ORAL AS NEEDED
Status: DISCONTINUED | OUTPATIENT
Start: 2020-10-28 | End: 2020-10-28 | Stop reason: HOSPADM

## 2020-10-28 RX ORDER — ONDANSETRON 2 MG/ML
4 INJECTION INTRAMUSCULAR; INTRAVENOUS EVERY 6 HOURS PRN
Status: DISCONTINUED | OUTPATIENT
Start: 2020-10-28 | End: 2020-10-29

## 2020-10-28 RX ORDER — SODIUM CHLORIDE 0.9 % (FLUSH) 0.9 %
10 SYRINGE (ML) INJECTION EVERY 12 HOURS SCHEDULED
Status: DISCONTINUED | OUTPATIENT
Start: 2020-10-28 | End: 2020-10-28 | Stop reason: HOSPADM

## 2020-10-28 RX ORDER — ONDANSETRON 2 MG/ML
4 INJECTION INTRAMUSCULAR; INTRAVENOUS EVERY 6 HOURS PRN
Status: DISCONTINUED | OUTPATIENT
Start: 2020-10-28 | End: 2020-10-29 | Stop reason: HOSPADM

## 2020-10-28 RX ORDER — SODIUM CHLORIDE 0.9 % (FLUSH) 0.9 %
1-10 SYRINGE (ML) INJECTION AS NEEDED
Status: DISCONTINUED | OUTPATIENT
Start: 2020-10-28 | End: 2020-10-28 | Stop reason: HOSPADM

## 2020-10-28 RX ORDER — BISACODYL 10 MG
10 SUPPOSITORY, RECTAL RECTAL DAILY PRN
Status: DISCONTINUED | OUTPATIENT
Start: 2020-10-29 | End: 2020-10-29 | Stop reason: HOSPADM

## 2020-10-28 RX ORDER — HYDROCODONE BITARTRATE AND ACETAMINOPHEN 5; 325 MG/1; MG/1
2 TABLET ORAL EVERY 4 HOURS PRN
Status: DISCONTINUED | OUTPATIENT
Start: 2020-10-28 | End: 2020-10-29 | Stop reason: HOSPADM

## 2020-10-28 RX ORDER — ONDANSETRON 2 MG/ML
4 INJECTION INTRAMUSCULAR; INTRAVENOUS ONCE
Status: COMPLETED | OUTPATIENT
Start: 2020-10-28 | End: 2020-10-28

## 2020-10-28 RX ORDER — OXYTOCIN/0.9 % SODIUM CHLORIDE 30/500 ML
650 PLASTIC BAG, INJECTION (ML) INTRAVENOUS ONCE
Status: DISCONTINUED | OUTPATIENT
Start: 2020-10-28 | End: 2020-10-29

## 2020-10-28 RX ORDER — ACETAMINOPHEN 500 MG
1000 TABLET ORAL EVERY 6 HOURS PRN
Status: ON HOLD | COMMUNITY
End: 2020-10-28

## 2020-10-28 RX ORDER — OXYTOCIN/0.9 % SODIUM CHLORIDE 30/500 ML
PLASTIC BAG, INJECTION (ML) INTRAVENOUS CONTINUOUS PRN
Status: DISCONTINUED | OUTPATIENT
Start: 2020-10-28 | End: 2020-10-28 | Stop reason: SURG

## 2020-10-28 RX ORDER — FENTANYL CITRATE 50 UG/ML
100 INJECTION, SOLUTION INTRAMUSCULAR; INTRAVENOUS
Status: DISCONTINUED | OUTPATIENT
Start: 2020-10-28 | End: 2020-10-29 | Stop reason: HOSPADM

## 2020-10-28 RX ORDER — FAMOTIDINE 10 MG/ML
20 INJECTION, SOLUTION INTRAVENOUS EVERY 12 HOURS PRN
Status: DISCONTINUED | OUTPATIENT
Start: 2020-10-28 | End: 2020-10-29

## 2020-10-28 RX ORDER — SODIUM CHLORIDE, SODIUM LACTATE, POTASSIUM CHLORIDE, CALCIUM CHLORIDE 600; 310; 30; 20 MG/100ML; MG/100ML; MG/100ML; MG/100ML
9 INJECTION, SOLUTION INTRAVENOUS CONTINUOUS
Status: DISCONTINUED | OUTPATIENT
Start: 2020-10-28 | End: 2020-10-29

## 2020-10-28 RX ORDER — SODIUM CHLORIDE, SODIUM LACTATE, POTASSIUM CHLORIDE, CALCIUM CHLORIDE 600; 310; 30; 20 MG/100ML; MG/100ML; MG/100ML; MG/100ML
100 INJECTION, SOLUTION INTRAVENOUS CONTINUOUS
Status: DISCONTINUED | OUTPATIENT
Start: 2020-10-28 | End: 2020-10-29

## 2020-10-28 RX ORDER — SODIUM CHLORIDE 0.9 % (FLUSH) 0.9 %
10 SYRINGE (ML) INJECTION AS NEEDED
Status: DISCONTINUED | OUTPATIENT
Start: 2020-10-28 | End: 2020-10-28 | Stop reason: HOSPADM

## 2020-10-28 RX ORDER — DOCUSATE SODIUM 100 MG/1
100 CAPSULE, LIQUID FILLED ORAL 2 TIMES DAILY
Status: DISCONTINUED | OUTPATIENT
Start: 2020-10-28 | End: 2020-10-29 | Stop reason: HOSPADM

## 2020-10-28 RX ORDER — FAMOTIDINE 10 MG/ML
20 INJECTION, SOLUTION INTRAVENOUS ONCE AS NEEDED
Status: DISCONTINUED | OUTPATIENT
Start: 2020-10-28 | End: 2020-10-29 | Stop reason: HOSPADM

## 2020-10-28 RX ORDER — OXYTOCIN/0.9 % SODIUM CHLORIDE 30/500 ML
85 PLASTIC BAG, INJECTION (ML) INTRAVENOUS ONCE
Status: DISCONTINUED | OUTPATIENT
Start: 2020-10-28 | End: 2020-10-29

## 2020-10-28 RX ORDER — MISOPROSTOL 200 UG/1
400 TABLET ORAL
Status: DISCONTINUED | OUTPATIENT
Start: 2020-10-28 | End: 2020-10-29

## 2020-10-28 RX ADMIN — FAMOTIDINE 20 MG: 10 INJECTION, SOLUTION INTRAVENOUS at 13:00

## 2020-10-28 RX ADMIN — SODIUM CHLORIDE, POTASSIUM CHLORIDE, SODIUM LACTATE AND CALCIUM CHLORIDE 9 ML/HR: 600; 310; 30; 20 INJECTION, SOLUTION INTRAVENOUS at 18:51

## 2020-10-28 RX ADMIN — PHENYLEPHRINE HYDROCHLORIDE 100 MCG: 10 INJECTION, SOLUTION INTRAMUSCULAR; INTRAVENOUS; SUBCUTANEOUS at 19:11

## 2020-10-28 RX ADMIN — DEXAMETHASONE SODIUM PHOSPHATE 4 MG: 4 INJECTION, SOLUTION INTRAMUSCULAR; INTRAVENOUS at 18:48

## 2020-10-28 RX ADMIN — SUCCINYLCHOLINE CHLORIDE 100 MG: 20 INJECTION, SOLUTION INTRAMUSCULAR; INTRAVENOUS at 19:01

## 2020-10-28 RX ADMIN — METHYLERGONOVINE MALEATE 200 MCG: 0.2 INJECTION, SOLUTION INTRAMUSCULAR; INTRAVENOUS at 19:14

## 2020-10-28 RX ADMIN — FAMOTIDINE 20 MG: 10 INJECTION INTRAVENOUS at 18:49

## 2020-10-28 RX ADMIN — Medication 125 ML/HR: at 19:19

## 2020-10-28 RX ADMIN — FENTANYL CITRATE 50 MCG: 50 INJECTION, SOLUTION INTRAMUSCULAR; INTRAVENOUS at 19:01

## 2020-10-28 RX ADMIN — MISOPROSTOL 400 MCG: 200 TABLET ORAL at 13:00

## 2020-10-28 RX ADMIN — MIDAZOLAM HYDROCHLORIDE 1 MG: 1 INJECTION, SOLUTION INTRAMUSCULAR; INTRAVENOUS at 18:52

## 2020-10-28 RX ADMIN — PHENYLEPHRINE HYDROCHLORIDE 100 MCG: 10 INJECTION, SOLUTION INTRAMUSCULAR; INTRAVENOUS; SUBCUTANEOUS at 19:18

## 2020-10-28 RX ADMIN — MISOPROSTOL 400 MCG: 200 TABLET ORAL at 17:42

## 2020-10-28 RX ADMIN — MISOPROSTOL 400 MCG: 200 TABLET ORAL at 09:12

## 2020-10-28 RX ADMIN — ONDANSETRON 4 MG: 2 INJECTION, SOLUTION INTRAMUSCULAR; INTRAVENOUS at 18:48

## 2020-10-28 RX ADMIN — FENTANYL CITRATE 50 MCG: 50 INJECTION, SOLUTION INTRAMUSCULAR; INTRAVENOUS at 19:05

## 2020-10-28 RX ADMIN — LIDOCAINE HYDROCHLORIDE 100 MG: 20 INJECTION, SOLUTION INFILTRATION; PERINEURAL at 19:01

## 2020-10-28 RX ADMIN — PROPOFOL 150 MG: 10 INJECTION, EMULSION INTRAVENOUS at 19:01

## 2020-10-28 RX ADMIN — CEFOXITIN 2 G: 2 INJECTION, POWDER, FOR SOLUTION INTRAVENOUS at 19:06

## 2020-10-28 RX ADMIN — PHENYLEPHRINE HYDROCHLORIDE 200 MCG: 10 INJECTION, SOLUTION INTRAMUSCULAR; INTRAVENOUS; SUBCUTANEOUS at 19:22

## 2020-10-28 NOTE — ANESTHESIA PREPROCEDURE EVALUATION
Anesthesia Evaluation     Patient summary reviewed and Nursing notes reviewed   no history of anesthetic complications:  NPO Solid Status: < 2 hours  NPO Liquid Status: < 2 hours           Airway   Mallampati: II  TM distance: >3 FB  Neck ROM: full  No difficulty expected  Dental    (+) edentulous    Pulmonary - normal exam    breath sounds clear to auscultation  (+) a smoker Current,   Cardiovascular - negative cardio ROS and normal exam  Exercise tolerance: good (4-7 METS)    Rhythm: regular  Rate: normal        Neuro/Psych- negative ROS  GI/Hepatic/Renal/Endo    (+) obesity,       Musculoskeletal (-) negative ROS    Abdominal   (+) obese,    Substance History - negative use     OB/GYN    (+) Pregnant,         Other - negative ROS                       Anesthesia Plan    ASA 2 - emergent     general     intravenous induction     Anesthetic plan, all risks, benefits, and alternatives have been provided, discussed and informed consent has been obtained with: patient.  Use of blood products discussed with patient  Consented to blood products.

## 2020-10-28 NOTE — ANESTHESIA PROCEDURE NOTES
Airway  Urgency: elective    Date/Time: 10/28/2020 7:02 PM  Airway not difficult    General Information and Staff    Patient location during procedure: OR  CRNA: Brad Betancur CRNA    Indications and Patient Condition  Indications for airway management: airway protection    Preoxygenated: yes  Mask difficulty assessment: 0 - not attempted    Final Airway Details  Final airway type: endotracheal airway      Successful airway: ETT  Cuffed: yes   Successful intubation technique: direct laryngoscopy and RSI  Blade: Ramirez  Blade size: 2  ETT size (mm): 7.0  Cormack-Lehane Classification: grade I - full view of glottis  Placement verified by: chest auscultation and capnometry   Measured from: lips  ETT/EBT  to lips (cm): 20  Number of attempts at approach: 1  Assessment: lips, teeth, and gum same as pre-op and atraumatic intubation

## 2020-10-29 VITALS
SYSTOLIC BLOOD PRESSURE: 108 MMHG | HEART RATE: 98 BPM | OXYGEN SATURATION: 96 % | TEMPERATURE: 98.7 F | RESPIRATION RATE: 16 BRPM | BODY MASS INDEX: 38.41 KG/M2 | HEIGHT: 64 IN | WEIGHT: 225 LBS | DIASTOLIC BLOOD PRESSURE: 55 MMHG

## 2020-10-29 LAB
HCT VFR BLD AUTO: 31.4 % (ref 34–46.6)
HGB BLD-MCNC: 10.2 G/DL (ref 12–15.9)

## 2020-10-29 PROCEDURE — 85014 HEMATOCRIT: CPT | Performed by: OBSTETRICS & GYNECOLOGY

## 2020-10-29 PROCEDURE — G0008 ADMIN INFLUENZA VIRUS VAC: HCPCS | Performed by: OBSTETRICS & GYNECOLOGY

## 2020-10-29 PROCEDURE — 90686 IIV4 VACC NO PRSV 0.5 ML IM: CPT | Performed by: OBSTETRICS & GYNECOLOGY

## 2020-10-29 PROCEDURE — 85018 HEMOGLOBIN: CPT | Performed by: OBSTETRICS & GYNECOLOGY

## 2020-10-29 PROCEDURE — 25010000002 INFLUENZA VAC SPLIT QUAD 0.5 ML SUSPENSION PREFILLED SYRINGE: Performed by: OBSTETRICS & GYNECOLOGY

## 2020-10-29 PROCEDURE — 99024 POSTOP FOLLOW-UP VISIT: CPT | Performed by: NURSE PRACTITIONER

## 2020-10-29 RX ORDER — HYDROCODONE BITARTRATE AND ACETAMINOPHEN 5; 325 MG/1; MG/1
2 TABLET ORAL EVERY 4 HOURS PRN
Qty: 20 TABLET | Refills: 0 | Status: SHIPPED | OUTPATIENT
Start: 2020-10-29 | End: 2020-11-04

## 2020-10-29 RX ORDER — IBUPROFEN 800 MG/1
800 TABLET ORAL EVERY 8 HOURS PRN
Qty: 30 TABLET | Refills: 0 | Status: SHIPPED | OUTPATIENT
Start: 2020-10-29 | End: 2021-05-29

## 2020-10-29 RX ORDER — PSEUDOEPHEDRINE HCL 30 MG
100 TABLET ORAL 2 TIMES DAILY
Qty: 30 CAPSULE | Refills: 0 | Status: SHIPPED | OUTPATIENT
Start: 2020-10-29 | End: 2021-05-29

## 2020-10-29 RX ADMIN — DOCUSATE SODIUM 100 MG: 100 CAPSULE ORAL at 09:23

## 2020-10-29 RX ADMIN — PRENATAL VIT W/ FE FUMARATE-FA TAB 27-0.8 MG 1 TABLET: 27-0.8 TAB at 09:23

## 2020-10-29 RX ADMIN — INFLUENZA VIRUS VACCINE 0.5 ML: 15; 15; 15; 15 SUSPENSION INTRAMUSCULAR at 06:36

## 2020-10-29 NOTE — ANESTHESIA POSTPROCEDURE EVALUATION
Patient: Syl Patel    Procedure Summary     Date: 10/28/20 Room / Location:  [facility] OR 1 /  [facility] OR    Anesthesia Start: 1855 Anesthesia Stop: 1946    Procedure: SUCTION DILATATION AND CURETTAGE FOR RETAINED PLACENTA  AFTER DELIVERY (N/A Uterus) Diagnosis:       Retained complete placenta      IUFD at less than 20 weeks of gestation      (Retained complete placenta [O73.0])      (IUFD at less than 20 weeks of gestation [O02.1])    Surgeon: Jose R Grey MD Provider: Brad Betancur CRNA    Anesthesia Type: general ASA Status: 2 - Emergent          Anesthesia Type: general    Vitals  Vitals Value Taken Time   /52 10/28/20 2010   Temp 98 °F (36.7 °C) 10/28/20 1952   Pulse 96 10/28/20 2014   Resp 18 10/28/20 2010   SpO2 98 % 10/28/20 2014   Vitals shown include unvalidated device data.        Post Anesthesia Care and Evaluation    Patient location during evaluation: PACU  Patient participation: complete - patient participated  Level of consciousness: awake and alert  Pain score: 0  Pain management: adequate  Airway patency: patent  Anesthetic complications: No anesthetic complications  PONV Status: none  Cardiovascular status: acceptable  Respiratory status: acceptable  Hydration status: acceptable

## 2020-11-03 LAB
LAB AP CASE REPORT: NORMAL
LAB AP CLINICAL INFORMATION: NORMAL
PATH REPORT.FINAL DX SPEC: NORMAL
PATH REPORT.GROSS SPEC: NORMAL

## 2020-11-05 LAB
BH BB BLOOD EXPIRATION DATE: NORMAL
BH BB BLOOD EXPIRATION DATE: NORMAL
BH BB BLOOD TYPE BARCODE: 5100
BH BB BLOOD TYPE BARCODE: 7300
BH BB DISPENSE STATUS: NORMAL
BH BB DISPENSE STATUS: NORMAL
BH BB PRODUCT CODE: NORMAL
BH BB PRODUCT CODE: NORMAL
BH BB UNIT NUMBER: NORMAL
BH BB UNIT NUMBER: NORMAL
CROSSMATCH INTERPRETATION: NORMAL
CROSSMATCH INTERPRETATION: NORMAL
UNIT  ABO: NORMAL
UNIT  ABO: NORMAL
UNIT  RH: NORMAL
UNIT  RH: NORMAL

## 2021-04-16 ENCOUNTER — BULK ORDERING (OUTPATIENT)
Dept: CASE MANAGEMENT | Facility: OTHER | Age: 26
End: 2021-04-16

## 2021-04-16 DIAGNOSIS — Z23 IMMUNIZATION DUE: ICD-10-CM

## 2021-05-29 ENCOUNTER — HOSPITAL ENCOUNTER (EMERGENCY)
Facility: HOSPITAL | Age: 26
Discharge: HOME | End: 2021-05-29
Attending: EMERGENCY MEDICINE | Admitting: EMERGENCY MEDICINE

## 2021-05-29 ENCOUNTER — APPOINTMENT (OUTPATIENT)
Dept: ULTRASOUND IMAGING | Facility: HOSPITAL | Age: 26
End: 2021-05-29

## 2021-05-29 VITALS
OXYGEN SATURATION: 97 % | BODY MASS INDEX: 35.62 KG/M2 | RESPIRATION RATE: 16 BRPM | HEIGHT: 66 IN | WEIGHT: 221.6 LBS | TEMPERATURE: 98.3 F | DIASTOLIC BLOOD PRESSURE: 70 MMHG | SYSTOLIC BLOOD PRESSURE: 111 MMHG | HEART RATE: 105 BPM

## 2021-05-29 DIAGNOSIS — O41.8X10 SUBCHORIONIC HEMATOMA IN FIRST TRIMESTER, SINGLE OR UNSPECIFIED FETUS: Primary | ICD-10-CM

## 2021-05-29 DIAGNOSIS — Z3A.08 8 WEEKS GESTATION OF PREGNANCY: ICD-10-CM

## 2021-05-29 DIAGNOSIS — O46.8X1 SUBCHORIONIC HEMATOMA IN FIRST TRIMESTER, SINGLE OR UNSPECIFIED FETUS: Primary | ICD-10-CM

## 2021-05-29 LAB
ALBUMIN SERPL-MCNC: 4.1 G/DL (ref 3.5–5.2)
ALBUMIN/GLOB SERPL: 1.4 G/DL
ALP SERPL-CCNC: 88 U/L (ref 39–117)
ALT SERPL W P-5'-P-CCNC: 15 U/L (ref 1–33)
ANION GAP SERPL CALCULATED.3IONS-SCNC: 13.4 MMOL/L (ref 5–15)
AST SERPL-CCNC: 11 U/L (ref 1–32)
BACTERIA UR QL AUTO: ABNORMAL /HPF
BASOPHILS # BLD AUTO: 0.06 10*3/MM3 (ref 0–0.2)
BASOPHILS NFR BLD AUTO: 0.5 % (ref 0–1.5)
BILIRUB SERPL-MCNC: 0.3 MG/DL (ref 0–1.2)
BILIRUB UR QL STRIP: NEGATIVE
BUN SERPL-MCNC: 8 MG/DL (ref 6–20)
BUN/CREAT SERPL: 13.1 (ref 7–25)
CALCIUM SPEC-SCNC: 9.4 MG/DL (ref 8.6–10.5)
CHLORIDE SERPL-SCNC: 104 MMOL/L (ref 98–107)
CLARITY UR: CLEAR
CO2 SERPL-SCNC: 20.6 MMOL/L (ref 22–29)
COLOR UR: YELLOW
CREAT SERPL-MCNC: 0.61 MG/DL (ref 0.57–1)
DEPRECATED RDW RBC AUTO: 41.2 FL (ref 37–54)
EOSINOPHIL # BLD AUTO: 0.21 10*3/MM3 (ref 0–0.4)
EOSINOPHIL NFR BLD AUTO: 1.6 % (ref 0.3–6.2)
ERYTHROCYTE [DISTWIDTH] IN BLOOD BY AUTOMATED COUNT: 12.2 % (ref 12.3–15.4)
GFR SERPL CREATININE-BSD FRML MDRD: 119 ML/MIN/1.73
GLOBULIN UR ELPH-MCNC: 3 GM/DL
GLUCOSE SERPL-MCNC: 90 MG/DL (ref 65–99)
GLUCOSE UR STRIP-MCNC: NEGATIVE MG/DL
HCG INTACT+B SERPL-ACNC: NORMAL MIU/ML
HCT VFR BLD AUTO: 42.4 % (ref 34–46.6)
HGB BLD-MCNC: 14 G/DL (ref 12–15.9)
HGB UR QL STRIP.AUTO: ABNORMAL
HYALINE CASTS UR QL AUTO: ABNORMAL /LPF
IMM GRANULOCYTES # BLD AUTO: 0.07 10*3/MM3 (ref 0–0.05)
IMM GRANULOCYTES NFR BLD AUTO: 0.5 % (ref 0–0.5)
KETONES UR QL STRIP: NEGATIVE
LEUKOCYTE ESTERASE UR QL STRIP.AUTO: ABNORMAL
LYMPHOCYTES # BLD AUTO: 2.46 10*3/MM3 (ref 0.7–3.1)
LYMPHOCYTES NFR BLD AUTO: 18.5 % (ref 19.6–45.3)
MCH RBC QN AUTO: 30.5 PG (ref 26.6–33)
MCHC RBC AUTO-ENTMCNC: 33 G/DL (ref 31.5–35.7)
MCV RBC AUTO: 92.4 FL (ref 79–97)
MONOCYTES # BLD AUTO: 0.75 10*3/MM3 (ref 0.1–0.9)
MONOCYTES NFR BLD AUTO: 5.6 % (ref 5–12)
NEUTROPHILS NFR BLD AUTO: 73.3 % (ref 42.7–76)
NEUTROPHILS NFR BLD AUTO: 9.75 10*3/MM3 (ref 1.7–7)
NITRITE UR QL STRIP: NEGATIVE
NRBC BLD AUTO-RTO: 0 /100 WBC (ref 0–0.2)
PH UR STRIP.AUTO: 6 [PH] (ref 4.5–8)
PLATELET # BLD AUTO: 321 10*3/MM3 (ref 140–450)
PMV BLD AUTO: 10.9 FL (ref 6–12)
POTASSIUM SERPL-SCNC: 3.8 MMOL/L (ref 3.5–5.2)
PROT SERPL-MCNC: 7.1 G/DL (ref 6–8.5)
PROT UR QL STRIP: NEGATIVE
RBC # BLD AUTO: 4.59 10*6/MM3 (ref 3.77–5.28)
RBC # UR: ABNORMAL /HPF
REF LAB TEST METHOD: ABNORMAL
SODIUM SERPL-SCNC: 138 MMOL/L (ref 136–145)
SP GR UR STRIP: 1.01 (ref 1–1.03)
SQUAMOUS #/AREA URNS HPF: ABNORMAL /HPF
UROBILINOGEN UR QL STRIP: ABNORMAL
WBC # BLD AUTO: 13.3 10*3/MM3 (ref 3.4–10.8)
WBC UR QL AUTO: ABNORMAL /HPF

## 2021-05-29 PROCEDURE — 99284 EMERGENCY DEPT VISIT MOD MDM: CPT | Performed by: PHYSICIAN ASSISTANT

## 2021-05-29 PROCEDURE — 99283 EMERGENCY DEPT VISIT LOW MDM: CPT

## 2021-05-29 PROCEDURE — 85025 COMPLETE CBC W/AUTO DIFF WBC: CPT | Performed by: PHYSICIAN ASSISTANT

## 2021-05-29 PROCEDURE — 80053 COMPREHEN METABOLIC PANEL: CPT | Performed by: PHYSICIAN ASSISTANT

## 2021-05-29 PROCEDURE — 81001 URINALYSIS AUTO W/SCOPE: CPT | Performed by: PHYSICIAN ASSISTANT

## 2021-05-29 PROCEDURE — 76817 TRANSVAGINAL US OBSTETRIC: CPT

## 2021-05-29 PROCEDURE — 84702 CHORIONIC GONADOTROPIN TEST: CPT | Performed by: PHYSICIAN ASSISTANT

## 2021-05-29 RX ORDER — PRENATAL VIT NO.126/IRON/FOLIC 28MG-0.8MG
TABLET ORAL DAILY
COMMUNITY

## 2021-06-03 ENCOUNTER — OFFICE VISIT (OUTPATIENT)
Dept: OBSTETRICS AND GYNECOLOGY | Facility: CLINIC | Age: 26
End: 2021-06-03

## 2021-06-03 VITALS
BODY MASS INDEX: 36 KG/M2 | HEIGHT: 66 IN | SYSTOLIC BLOOD PRESSURE: 110 MMHG | DIASTOLIC BLOOD PRESSURE: 82 MMHG | WEIGHT: 224 LBS

## 2021-06-03 DIAGNOSIS — O46.8X1 SUBCHORIONIC HEMATOMA IN FIRST TRIMESTER, SINGLE OR UNSPECIFIED FETUS: ICD-10-CM

## 2021-06-03 DIAGNOSIS — O20.0 THREATENED ABORTION: Primary | ICD-10-CM

## 2021-06-03 DIAGNOSIS — K21.9 GASTROESOPHAGEAL REFLUX DISEASE WITHOUT ESOPHAGITIS: ICD-10-CM

## 2021-06-03 DIAGNOSIS — O41.8X10 SUBCHORIONIC HEMATOMA IN FIRST TRIMESTER, SINGLE OR UNSPECIFIED FETUS: ICD-10-CM

## 2021-06-03 DIAGNOSIS — R11.14 BILIOUS VOMITING WITH NAUSEA: ICD-10-CM

## 2021-06-03 PROCEDURE — 99214 OFFICE O/P EST MOD 30 MIN: CPT | Performed by: OBSTETRICS & GYNECOLOGY

## 2021-06-03 RX ORDER — ONDANSETRON 4 MG/1
4 TABLET, FILM COATED ORAL EVERY 8 HOURS PRN
Qty: 30 TABLET | Refills: 2 | Status: SHIPPED | OUTPATIENT
Start: 2021-06-03 | End: 2022-11-30

## 2021-06-03 RX ORDER — FAMOTIDINE 20 MG/1
20 TABLET, FILM COATED ORAL 2 TIMES DAILY
Qty: 60 TABLET | Refills: 3 | Status: SHIPPED | OUTPATIENT
Start: 2021-06-03 | End: 2021-06-15

## 2021-06-03 NOTE — PROGRESS NOTES
"      Syl Patel is a 25 y.o. patient who presents for follow up of   Chief Complaint   Patient presents with   • Follow-up     from ER       HPI 25-year-old female presents with early pregnancy and episode of vaginal bleeding.  She was seen in the emergency department diagnosed with a small subchorionic hemorrhage.  She has had no bleeding since that day.  She has a recent history of a pregnancy with a cystic hygroma resulting in an IUFD and delivery at less than 20 weeks of gestation.  She was then subsequently admitted with retained placenta and required suction D&C.  This is a new pregnancy for her.  In addition she has associated symptoms of nausea and vomiting as well as GERD with coughing as she lays down in bed at night.  These are new symptoms to her.  She has not been treated at home with any medications.    The following portions of the patient's history were reviewed and updated as appropriate: allergies, current medications and problem list.    Review of Systems   Constitutional: Positive for appetite change. Negative for fever and unexpected weight change.   HENT: Negative for congestion and sore throat.    Respiratory: Positive for cough. Negative for shortness of breath.    Cardiovascular: Negative for chest pain and palpitations.   Gastrointestinal: Positive for nausea and vomiting. Negative for abdominal distention, abdominal pain, constipation and diarrhea.   Endocrine: Negative.    Genitourinary: Negative for dyspareunia, menstrual problem, pelvic pain and vaginal discharge.   Skin: Negative.    Neurological: Negative for dizziness and syncope.   Hematological: Negative.    Psychiatric/Behavioral: Negative for dysphoric mood and sleep disturbance. The patient is not nervous/anxious.        /82   Ht 167.6 cm (66\")   Wt 102 kg (224 lb)   LMP 03/22/2020 (Exact Date)   BMI 36.15 kg/m²     Physical Exam  Vitals and nursing note reviewed.   Constitutional:       Appearance: She is " well-developed.   HENT:      Head: Normocephalic and atraumatic.   Pulmonary:      Effort: Pulmonary effort is normal. No respiratory distress.   Abdominal:      General: There is no distension.      Palpations: Abdomen is soft. There is no mass.      Tenderness: There is no abdominal tenderness. There is no guarding or rebound.   Musculoskeletal:         General: Normal range of motion.   Skin:     General: Skin is warm and dry.   Neurological:      Mental Status: She is alert and oriented to person, place, and time.   Psychiatric:         Behavior: Behavior normal.         Thought Content: Thought content normal.         Judgment: Judgment normal.     Bedside ultrasound was performed.  Live viable perez fetus with cardiac activity was seen.      Assessment/Plan    Diagnoses and all orders for this visit:    1. Threatened  (Primary)    2. Subchorionic hematoma in first trimester, single or unspecified fetus    3. Bilious vomiting with nausea    4. Gastroesophageal reflux disease without esophagitis    Other orders  -     ondansetron (Zofran) 4 MG tablet; Take 1 tablet by mouth Every 8 (Eight) Hours As Needed for Nausea or Vomiting.  Dispense: 30 tablet; Refill: 2  -     famotidine (Pepcid) 20 MG tablet; Take 1 tablet by mouth 2 (Two) Times a Day.  Dispense: 60 tablet; Refill: 3    No bleeding since the ED visit.  Watch for SAB symptoms.  Start Zofran for nausea and vomiting.  Pepcid for her GERD with coughing at night.  She can add Benadryl if she thinks it is related to drainage.  Repeat ultrasound in 2 weeks and do new OB physical at that visit.    Return in about 2 weeks (around 2021) for NOB, US, TV.      Liam Dang MD  6/3/2021  14:26 EDT

## 2021-06-15 ENCOUNTER — INITIAL PRENATAL (OUTPATIENT)
Dept: OBSTETRICS AND GYNECOLOGY | Facility: CLINIC | Age: 26
End: 2021-06-15

## 2021-06-15 VITALS — DIASTOLIC BLOOD PRESSURE: 70 MMHG | SYSTOLIC BLOOD PRESSURE: 112 MMHG | BODY MASS INDEX: 35.88 KG/M2 | WEIGHT: 222.3 LBS

## 2021-06-15 DIAGNOSIS — Z98.891 PREVIOUS CESAREAN SECTION: ICD-10-CM

## 2021-06-15 DIAGNOSIS — E04.9 ENLARGED THYROID: ICD-10-CM

## 2021-06-15 DIAGNOSIS — O02.1 IUFD AT LESS THAN 20 WEEKS OF GESTATION: ICD-10-CM

## 2021-06-15 DIAGNOSIS — Z36.9 ENCOUNTER FOR ANTENATAL SCREENING, UNSPECIFIED: ICD-10-CM

## 2021-06-15 DIAGNOSIS — Z34.91 INITIAL OBSTETRIC VISIT IN FIRST TRIMESTER: Primary | ICD-10-CM

## 2021-06-15 DIAGNOSIS — E71.311 MCAD (MEDIUM-CHAIN ACYL-COA DEHYDROGENASE DEFICIENCY) (HCC): ICD-10-CM

## 2021-06-15 DIAGNOSIS — O21.9 NAUSEA AND VOMITING DURING PREGNANCY PRIOR TO 22 WEEKS GESTATION: ICD-10-CM

## 2021-06-15 LAB
GLUCOSE UR STRIP-MCNC: NEGATIVE MG/DL
PROT UR STRIP-MCNC: NEGATIVE MG/DL

## 2021-06-15 PROCEDURE — 99214 OFFICE O/P EST MOD 30 MIN: CPT | Performed by: NURSE PRACTITIONER

## 2021-06-15 RX ORDER — LANSOPRAZOLE 30 MG/1
30 CAPSULE, DELAYED RELEASE ORAL DAILY
Qty: 30 CAPSULE | Refills: 1 | Status: SHIPPED | OUTPATIENT
Start: 2021-06-15 | End: 2021-10-13 | Stop reason: SDUPTHER

## 2021-06-15 NOTE — PROGRESS NOTES
Initial ob visit     Chief Complaint   Patient presents with   • Initial Prenatal Visit       Syl Patel is being seen today for her first obstetrical visit.  She is a 25 y.o.    10w6d gestation.     # 1 - Date: None, Sex: None, Weight: None, GA: None, Delivery: None, Apgar1: None, Apgar5: None, Living: None, Birth Comments: None    # 2 - Date: None, Sex: None, Weight: None, GA: None, Delivery: None, Apgar1: None, Apgar5: None, Living: None, Birth Comments: None    # 3 - Date: None, Sex: None, Weight: None, GA: None, Delivery: None, Apgar1: None, Apgar5: None, Living: None, Birth Comments: None    # 4 - Date: , Sex: Male, Weight: 3714 g (8 lb 3 oz), GA: 40w0d, Delivery: , Low Transverse, Apgar1: None, Apgar5: None, Living: Living, Birth Comments: None    # 5 - Date: , Sex: Male, Weight: 3600 g (7 lb 15 oz), GA: None, Delivery: , Low Transverse, Apgar1: None, Apgar5: None, Living: Living, Birth Comments: None    # 6 - Date: None, Sex: None, Weight: None, GA: None, Delivery: None, Apgar1: None, Apgar5: None, Living: None, Birth Comments: None    LNMP: 3/22/21  Taking prenatal vitamins: Yes  Planned pregnancy: Yes  Prior obstetric issues, potential pregnancy concerns:  x2  Family history of genetic issues (includes FOB): Nephew born with MCAD- PT IS A CARRIER   Prior infections concerning in pregnancy (Rash, fever in last 2 weeks): denies  Varicella Hx: As child  Flu vaccine: 2020  History of STDs: denies HSV  Current medications: PNV   Last pap smear:    Smoker: Yes  Drug or alcohol abuse: No  Prior testing for Cystic Fibrosis Carrier or Sickle Cell Trait- uncertain   Prepregnancy BMI: Body mass index is 35.88 kg/m².      No past medical history on file.    Past Surgical History:   Procedure Laterality Date   •  SECTION     • D & C HYSTEROSCOPY N/A 10/28/2020    Procedure: SUCTION DILATATION AND CURETTAGE FOR RETAINED PLACENTA  AFTER DELIVERY;  Surgeon:  Jose R Grey MD;  Location: [facility];  Service: Obstetrics/Gynecology;  Laterality: N/A;         Current Outpatient Medications:   •  famotidine (Pepcid) 20 MG tablet, Take 1 tablet by mouth 2 (Two) Times a Day., Disp: 60 tablet, Rfl: 3  •  ondansetron (Zofran) 4 MG tablet, Take 1 tablet by mouth Every 8 (Eight) Hours As Needed for Nausea or Vomiting., Disp: 30 tablet, Rfl: 2  •  prenatal vitamin (prenatal, CLASSIC, vitamin) tablet, Take  by mouth Daily., Disp: , Rfl:     No Known Allergies    Social History     Socioeconomic History   • Marital status:      Spouse name: Not on file   • Number of children: Not on file   • Years of education: Not on file   • Highest education level: Not on file   Tobacco Use   • Smoking status: Current Every Day Smoker     Packs/day: 1.00   • Smokeless tobacco: Never Used   • Tobacco comment: 1-800 quit now    Vaping Use   • Vaping Use: Never used   • Passive vaping exposure Yes   Substance and Sexual Activity   • Alcohol use: Never   • Drug use: Never   • Sexual activity: Yes     Partners: Male       Family History   Problem Relation Age of Onset   • Stroke Father        Review of systems     All other systems reviewed and are negative except for: Constitutional: positive for fatigue  Gastrointestinal: positive for nausea  Integument/breast: positive for breast tenderness     Objective    /70   Wt 101 kg (222 lb 4.8 oz)   LMP 03/22/2020 (Exact Date)   Breastfeeding Unknown   BMI 35.88 kg/m²       General Appearance:    Alert, cooperative, in no acute distress, habitus obese   Head:    Not examined   Eyes:           Not examined   Ears:  Not examined       Neck: Thyroid enlargement    Back:     No kyphosis present, no scoliosis present,                       Lungs:     Clear to auscultation,respirations regular, even and                   unlabored    Heart:    Regular rhythm and normal rate, normal S1 and S2, no            murmur, no gallop, no rub,  no click   Breast Exam:    Def   Abdomen:     Normal bowel sounds, no masses, no organomegaly, soft        non-tender, non-distended, no guarding, no rebound                 tenderness   Genitalia:    Vulva - No masses, no atrophy, no lesions    Vagina - No discharge, No bleeding    Cervix - No Lesions, closed. Pap collected:No     Uterus - Consistent with 10 weeks.     Adnexa - No masses, non tender       Extremities:   Moves all extremities well, no edema, no cyanosis, no              redness       Skin:   No bleeding, bruising or rash   Lymph nodes:   No palpable adenopathy   Neurologic:   Sensation intact, A&O times 3      Assessment/Plan    1) Pregnancy at 10w6d- US IMP: Single, viable 10.6 weeks. . No evidence of subchorionic hemorrhage. EDC confirmed 1/5/22.  US findings discussed with patient. EDC established 1/5/22 and confirmed by US. EDC by LN rejected.     2) OB exam: OB exam completed: Yes. New OB bag provided Yes. Pap collected: No.    3) Labs: OB labs collected: Yes Counseled on genetic screening: Yes, she has previous neg CF, SMA, and Fragile X. Counseled on Quad screen and AFP: No, she is to early for  AFP. Counseled on NIPS: Yes, she desires NIPS.      4) Patient's Body mass index is 35.88 kg/m². indicating that she is obese (BMI >30). Obesity-related health conditions include the following: obstructive sleep apnea, hypertension, coronary heart disease, diabetes mellitus, dyslipidemias and GERD. Obesity is unchanged. BMI is is above average; BMI management plan is completed. We discussed portion control and increasing exercise. Obese women with a pre-pregnancy BMI of 30+ should strive to gain approx 11-20 pounds for the entire pregnancy.      5)  Prenatal care: Oriented to the office and prenatal care. Encourage prenatal vitamins. Disc Tylenol products are fine, avoid aspirin and ibuprofen; Zika (travel restrictions/ok to use insect repellant); not to change cat litter; food restrictions;  "exercise;  avoidance of alcohol, tobacco, drugs and saunas/hot tubs.     6) Previous  x 2- Plan repeat @ 39 weeks    7) H/O IUFD- @ 17.4 weeks.  Trisomy 21 with large cystic hygroma.     8) Family history of MCAD (medium-chain acetyl COA dehydrogenase)-  Pt is + carrier.  FOB has not been tested, disc needs test.      9) Smoker- Patient was counseled for 3  minutes regarding effects of smoking in pregnancy, including risk of poor growth,  birth and stillbirth as well as an increase in asthma and ear infections in infancy.  Patient did voice a willingness to quit and information was given for review. She is currently smoking 1/2 PPD of cigarrettes.  She is unwilling to set a quit date but says that she will try to smoke less     10) Pap NIL - Plan pap at postpartum.     11) Subchorionic hemorrhage- Resolved. Restrictions lifted. Had bleeding and was seen in the ER in earlier pregnancy. Rh +.     12) COVID 19- COVID19 precautions were reviewed with the patient. Continue to encourage social distancing, wearing a mask, and good hand hygiene. She is not working outside of the home. Recommend maternity leave @ 36 weeks gestation if working outside the home. I wore a mask, protective eye wear, and gloves during this patient encounter.  Patient also wearing a surgical mask.  Hand hygeine performed before and after seeing the patient. Declines vaccine info.     13) Enlarged thyroid-  (L) > (R). Check thyroid lab and TPO. Check thyroid US.     14) Nausea- Improved with zofran. Enc small frequent meals. .     15) Heartburn- Pepcid is not helping \"at all.\" Change to prevacid, erx sent.     All questions answered.     RTO 4 weeks     ROSS Forrester  6/15/2021  10:53 EDT    "

## 2021-06-16 LAB
ABO GROUP BLD: ABNORMAL
AMPHETAMINES UR QL SCN: NEGATIVE NG/ML
BACTERIA UR CULT: NORMAL
BACTERIA UR CULT: NORMAL
BARBITURATES UR QL SCN: NEGATIVE NG/ML
BASOPHILS # BLD AUTO: 0.1 X10E3/UL (ref 0–0.2)
BASOPHILS NFR BLD AUTO: 1 %
BENZODIAZ UR QL SCN: NEGATIVE NG/ML
BLD GP AB SCN SERPL QL: NEGATIVE
BZE UR QL SCN: NEGATIVE NG/ML
CANNABINOIDS UR QL SCN: NEGATIVE NG/ML
CREAT UR-MCNC: 244.9 MG/DL (ref 20–300)
EOSINOPHIL # BLD AUTO: 0.4 X10E3/UL (ref 0–0.4)
EOSINOPHIL NFR BLD AUTO: 3 %
ERYTHROCYTE [DISTWIDTH] IN BLOOD BY AUTOMATED COUNT: 12.6 % (ref 11.7–15.4)
HBA1C MFR BLD: 5.4 % (ref 4.8–5.6)
HBV SURFACE AG SERPL QL IA: NEGATIVE
HCT VFR BLD AUTO: 41.3 % (ref 34–46.6)
HCV AB S/CO SERPL IA: <0.1 S/CO RATIO (ref 0–0.9)
HGB BLD-MCNC: 13.7 G/DL (ref 11.1–15.9)
HIV 1+2 AB+HIV1 P24 AG SERPL QL IA: NON REACTIVE
IMM GRANULOCYTES # BLD AUTO: 0 X10E3/UL (ref 0–0.1)
IMM GRANULOCYTES NFR BLD AUTO: 0 %
LABORATORY COMMENT REPORT: NORMAL
LYMPHOCYTES # BLD AUTO: 2.7 X10E3/UL (ref 0.7–3.1)
LYMPHOCYTES NFR BLD AUTO: 22 %
MCH RBC QN AUTO: 30.9 PG (ref 26.6–33)
MCHC RBC AUTO-ENTMCNC: 33.2 G/DL (ref 31.5–35.7)
MCV RBC AUTO: 93 FL (ref 79–97)
METHADONE UR QL SCN: NEGATIVE NG/ML
MONOCYTES # BLD AUTO: 0.4 X10E3/UL (ref 0.1–0.9)
MONOCYTES NFR BLD AUTO: 4 %
NEUTROPHILS # BLD AUTO: 8.4 X10E3/UL (ref 1.4–7)
NEUTROPHILS NFR BLD AUTO: 70 %
OPIATES UR QL SCN: NEGATIVE NG/ML
OXYCODONE+OXYMORPHONE UR QL SCN: NEGATIVE NG/ML
PCP UR QL: NEGATIVE NG/ML
PH UR: 6 [PH] (ref 4.5–8.9)
PLATELET # BLD AUTO: 271 X10E3/UL (ref 150–450)
PROPOXYPH UR QL SCN: NEGATIVE NG/ML
RBC # BLD AUTO: 4.44 X10E6/UL (ref 3.77–5.28)
RH BLD: POSITIVE
RPR SER QL: NON REACTIVE
RUBV IGG SERPL IA-ACNC: 3.48 INDEX
T3 SERPL-MCNC: 179 NG/DL (ref 80–200)
T4 FREE SERPL-MCNC: 1.22 NG/DL (ref 0.93–1.7)
THYROPEROXIDASE AB SERPL-ACNC: <9 IU/ML (ref 0–34)
TSH SERPL DL<=0.005 MIU/L-ACNC: 2.51 UIU/ML (ref 0.27–4.2)
VZV IGG SER IA-ACNC: 640 INDEX
WBC # BLD AUTO: 11.9 X10E3/UL (ref 3.4–10.8)

## 2021-06-20 LAB

## 2021-06-24 PROBLEM — E04.9 ENLARGED THYROID: Status: ACTIVE | Noted: 2021-06-24

## 2021-06-24 PROBLEM — Z34.91 INITIAL OBSTETRIC VISIT IN FIRST TRIMESTER: Status: ACTIVE | Noted: 2021-06-24

## 2021-07-02 ENCOUNTER — APPOINTMENT (OUTPATIENT)
Dept: ULTRASOUND IMAGING | Facility: HOSPITAL | Age: 26
End: 2021-07-02

## 2021-07-07 ENCOUNTER — HOSPITAL ENCOUNTER (OUTPATIENT)
Dept: ULTRASOUND IMAGING | Facility: HOSPITAL | Age: 26
Discharge: HOME | End: 2021-07-07
Admitting: NURSE PRACTITIONER

## 2021-07-07 DIAGNOSIS — E04.9 ENLARGED THYROID: ICD-10-CM

## 2021-07-07 PROCEDURE — 76536 US EXAM OF HEAD AND NECK: CPT

## 2021-07-16 ENCOUNTER — ROUTINE PRENATAL (OUTPATIENT)
Dept: OBSTETRICS AND GYNECOLOGY | Facility: CLINIC | Age: 26
End: 2021-07-16

## 2021-07-16 VITALS — BODY MASS INDEX: 35.51 KG/M2 | SYSTOLIC BLOOD PRESSURE: 118 MMHG | WEIGHT: 220 LBS | DIASTOLIC BLOOD PRESSURE: 64 MMHG

## 2021-07-16 DIAGNOSIS — O35.8XX0 CYSTIC HYGROMA OF FETUS IN SINGLETON PREGNANCY: ICD-10-CM
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
## 2021-07-16 DIAGNOSIS — E71.311 MCAD (MEDIUM-CHAIN ACYL-COA DEHYDROGENASE DEFICIENCY) (HCC): ICD-10-CM

## 2021-07-16 DIAGNOSIS — O41.8X10 SUBCHORIONIC HEMATOMA IN FIRST TRIMESTER, SINGLE OR UNSPECIFIED FETUS: ICD-10-CM

## 2021-07-16 DIAGNOSIS — F17.200 SMOKER: ICD-10-CM

## 2021-07-16 DIAGNOSIS — Z98.891 PREVIOUS CESAREAN SECTION: ICD-10-CM

## 2021-07-16 DIAGNOSIS — Z36.9 ENCOUNTER FOR ANTENATAL SCREENING, UNSPECIFIED: Primary | ICD-10-CM

## 2021-07-16 DIAGNOSIS — O02.1 IUFD AT LESS THAN 20 WEEKS OF GESTATION: ICD-10-CM

## 2021-07-16 DIAGNOSIS — O21.9 NAUSEA AND VOMITING DURING PREGNANCY PRIOR TO 22 WEEKS GESTATION: ICD-10-CM

## 2021-07-16 DIAGNOSIS — O46.8X1 SUBCHORIONIC HEMATOMA IN FIRST TRIMESTER, SINGLE OR UNSPECIFIED FETUS: ICD-10-CM

## 2021-07-16 DIAGNOSIS — E04.9 ENLARGED THYROID: ICD-10-CM

## 2021-07-16 PROBLEM — Z34.91 INITIAL OBSTETRIC VISIT IN FIRST TRIMESTER: Status: RESOLVED | Noted: 2021-06-24 | Resolved: 2021-07-16

## 2021-07-16 LAB
GLUCOSE UR STRIP-MCNC: NEGATIVE MG/DL
PROT UR STRIP-MCNC: NEGATIVE MG/DL

## 2021-07-16 PROCEDURE — 99214 OFFICE O/P EST MOD 30 MIN: CPT | Performed by: OBSTETRICS & GYNECOLOGY

## 2021-07-16 RX ORDER — NICOTINE 21 MG/24HR
1 PATCH, TRANSDERMAL 24 HOURS TRANSDERMAL EVERY 24 HOURS
Qty: 30 PATCH | Refills: 3 | Status: SHIPPED | OUTPATIENT
Start: 2021-07-16 | End: 2022-11-30
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
## 2021-07-21 LAB
AFP ADJ MOM SERPL: 0.58
AFP INTERP SERPL-IMP: NORMAL
AFP INTERP SERPL-IMP: NORMAL
AFP SERPL-MCNC: 14.3 NG/ML
AGE AT DELIVERY: 27.1 YR
GA METHOD: NORMAL
GA: 15.3 WEEKS
IDDM PATIENT QL: NORMAL
LABORATORY COMMENT REPORT: NORMAL
MULTIPLE PREGNANCY: NO
NEURAL TUBE DEFECT RISK FETUS: NORMAL %
RESULT: NORMAL

## 2021-08-17 ENCOUNTER — ROUTINE PRENATAL (OUTPATIENT)
Dept: OBSTETRICS AND GYNECOLOGY | Facility: CLINIC | Age: 26
End: 2021-08-17

## 2021-08-17 VITALS — BODY MASS INDEX: 36.8 KG/M2 | SYSTOLIC BLOOD PRESSURE: 122 MMHG | DIASTOLIC BLOOD PRESSURE: 78 MMHG | WEIGHT: 228 LBS

## 2021-08-17 DIAGNOSIS — F17.200 SMOKER: ICD-10-CM

## 2021-08-17 DIAGNOSIS — Z34.93 PRENATAL CARE IN THIRD TRIMESTER: Primary | ICD-10-CM

## 2021-08-17 DIAGNOSIS — O02.1 IUFD AT LESS THAN 20 WEEKS OF GESTATION: ICD-10-CM

## 2021-08-17 DIAGNOSIS — E71.311 MCAD (MEDIUM-CHAIN ACYL-COA DEHYDROGENASE DEFICIENCY) (HCC): ICD-10-CM

## 2021-08-17 LAB
EXTERNAL CYSTIC FIBROSIS: NORMAL
EXTERNAL NIPT: NORMAL
GLUCOSE UR STRIP-MCNC: NEGATIVE MG/DL
PROT UR STRIP-MCNC: NEGATIVE MG/DL

## 2021-08-17 PROCEDURE — 99213 OFFICE O/P EST LOW 20 MIN: CPT | Performed by: NURSE PRACTITIONER

## 2021-08-17 NOTE — PROGRESS NOTES
OB follow up     CC:  Here for prenatal follow up    Syl Patel is a 25 y.o.  19w6d being seen today for her obstetrical visit.  Patient reports fatigue. Taking +PNV.     Review of Systems  Consitutional: + fatigue  Cardiovascular: neg edema  Gastrointestinal: neg n/v  Genitourinary: Neg for cramping, vaginal bleeding, SROM, or dysuria.       /78   Wt 103 kg (228 lb)   LMP 2020 (Exact Date)   BMI 36.80 kg/m²     FHT: present BPM   Uterine Size: size equals dates   Assessment    1) Pregnancy at 19w6d- US IMP: Normal anatomy scan but limited views of heart and face. CL 4.8cm.     2) Subchorionic hemorrhage- Resolved.     3) Previous  x 2- Plan repeat @ 39 weeks.      4) H/O IUFD- @ 17.4 weeks.  Trisomy 21 with large cystic hygroma.      5) Family history of MCAD (medium-chain acetyl COA dehydrogenase) -  Pt is + carrier.  FOB has not been tested. Partner testing completed today.       6) Smoker- Patient was counseled for 3  minutes regarding effects of smoking in pregnancy, including risk of poor growth,  birth and stillbirth as well as an increase in asthma and ear infections in infancy.  Patient did voice a willingness to quit and information was given for review. She is currently smoking 1/2 PPD of cigarrettes.  She is unwilling to set a quit date but says that she will try to smoke less     10) Pap NIL - Plan pap at postpartum.      11) COVID 19- COVID19 precautions were reviewed with the patient. Continue to encourage social distancing, wearing a mask, and good hand hygiene. She is not working outside of the home. Recommend maternity leave @ 36 weeks gestation if working outside the home. I wore a mask and gloves during this patient encounter.  Patient also wearing a surgical mask.  Hand hygeine performed before and after seeing the patient. Declines vaccine info.      13) Enlarged thyroid- Thyroid labs normal. Normal thyroid US.      14) Heartburn- Taking prevacid  with good relief.     Plan    Continue prenatal vitamins   Reviewed this stage of pregnancy  Problem list updated   Follow up in 2 weeks for OB tummy and repeat anatomy US for heart and face views     Trish Sahu, APRN  8/17/2021  16:16 EDT

## 2021-08-31 ENCOUNTER — ROUTINE PRENATAL (OUTPATIENT)
Dept: OBSTETRICS AND GYNECOLOGY | Facility: CLINIC | Age: 26
End: 2021-08-31

## 2021-08-31 VITALS — DIASTOLIC BLOOD PRESSURE: 88 MMHG | SYSTOLIC BLOOD PRESSURE: 120 MMHG | BODY MASS INDEX: 37.51 KG/M2 | WEIGHT: 232.4 LBS

## 2021-08-31 DIAGNOSIS — E71.311 MCAD (MEDIUM-CHAIN ACYL-COA DEHYDROGENASE DEFICIENCY) (HCC): ICD-10-CM

## 2021-08-31 DIAGNOSIS — Z34.92 PRENATAL CARE IN SECOND TRIMESTER: Primary | ICD-10-CM

## 2021-08-31 LAB
GLUCOSE UR STRIP-MCNC: NEGATIVE MG/DL
PROT UR STRIP-MCNC: ABNORMAL MG/DL

## 2021-08-31 PROCEDURE — 99213 OFFICE O/P EST LOW 20 MIN: CPT | Performed by: NURSE PRACTITIONER

## 2021-08-31 NOTE — PROGRESS NOTES
OB follow up     CC:  Here for prenatal follow up    Syl Patel is a 25 y.o.  21w6d being seen today for her obstetrical visit.  Patient reports no complaints. Taking +PNV. She had a repeat anatomy US today. Last visit, the US views were incomplete of the face and heart.     Review of Systems  Consitutional:  Neg fatigue  Cardiovascular: neg edema  Gastrointestinal: neg n/v  Genitourinary: Neg for cramping, vaginal bleeding, SROM, or dysuria.       /88   Wt 105 kg (232 lb 6.4 oz)   LMP 2020 (Exact Date)   BMI 37.51 kg/m²     FHT: present BPM   Uterine Size: size equals dates   Assessment    1) Pregnancy at 21w6d- US IMP: Normal face and cardiac views.     2) Subchorionic hemorrhage- Resolved.     3) Previous  x 2- Plan repeat @ 39 weeks.      4) H/O IUFD- @ 17.4 weeks.  Trisomy 21 with large cystic hygroma.      5) Family history of MCAD (medium-chain acetyl COA dehydrogenase) -  Pt is + carrier.  FOB testing is neg (Krystal looked up results on [employer] website).      6) Smoker- Patient was counseled for 3  minutes regarding effects of smoking in pregnancy, including risk of poor growth,  birth and stillbirth as well as an increase in asthma and ear infections in infancy.  Patient did voice a willingness to quit and information was given for review. She is currently smoking < 1/2 PPD of cigarrettes.  She is unwilling to set a quit date but says that she will try to smoke less     10) Pap NIL - Plan pap at postpartum.      11) COVID 19- COVID19 precautions were reviewed with the patient. Continue to encourage social distancing, wearing a mask, and good hand hygiene. She is not working outside of the home. Recommend maternity leave @ 36 weeks gestation if working outside the home. I wore a mask and gloves during this patient encounter.  Patient also wearing a surgical mask.  Hand hygeine performed before and after seeing the patient. Declines vaccine info.      13)  Enlarged thyroid- Thyroid labs normal. Normal thyroid US.      14) Heartburn- Taking prevacid with good relief.     Plan    Continue prenatal vitamins   Reviewed this stage of pregnancy  Problem list updated   Follow up in 4 weeks     ROSS Forrester  8/31/2021  14:21 EDT

## 2021-09-28 ENCOUNTER — ROUTINE PRENATAL (OUTPATIENT)
Dept: OBSTETRICS AND GYNECOLOGY | Facility: CLINIC | Age: 26
End: 2021-09-28

## 2021-09-28 VITALS — DIASTOLIC BLOOD PRESSURE: 72 MMHG | BODY MASS INDEX: 38.12 KG/M2 | SYSTOLIC BLOOD PRESSURE: 128 MMHG | WEIGHT: 236.2 LBS

## 2021-09-28 DIAGNOSIS — Z98.891 PREVIOUS CESAREAN SECTION: ICD-10-CM

## 2021-09-28 DIAGNOSIS — F17.200 SMOKER: Primary | ICD-10-CM

## 2021-09-28 DIAGNOSIS — E71.311 MCAD (MEDIUM-CHAIN ACYL-COA DEHYDROGENASE DEFICIENCY) (HCC): ICD-10-CM

## 2021-09-28 DIAGNOSIS — O35.8XX0 CYSTIC HYGROMA OF FETUS IN SINGLETON PREGNANCY: ICD-10-CM

## 2021-09-28 DIAGNOSIS — E04.9 ENLARGED THYROID: ICD-10-CM

## 2021-09-28 LAB
GLUCOSE UR STRIP-MCNC: NEGATIVE MG/DL
PROT UR STRIP-MCNC: NEGATIVE MG/DL

## 2021-09-28 PROCEDURE — 99214 OFFICE O/P EST MOD 30 MIN: CPT | Performed by: OBSTETRICS & GYNECOLOGY
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
## 2021-10-13 ENCOUNTER — ROUTINE PRENATAL (OUTPATIENT)
Dept: OBSTETRICS AND GYNECOLOGY | Facility: CLINIC | Age: 26
End: 2021-10-13

## 2021-10-13 VITALS — BODY MASS INDEX: 38.41 KG/M2 | WEIGHT: 238 LBS | DIASTOLIC BLOOD PRESSURE: 78 MMHG | SYSTOLIC BLOOD PRESSURE: 124 MMHG

## 2021-10-13 DIAGNOSIS — Z36.9 UNSPECIFIED ANTENATAL SCREENING: ICD-10-CM

## 2021-10-13 DIAGNOSIS — Z34.93 PRENATAL CARE IN THIRD TRIMESTER: Primary | ICD-10-CM

## 2021-10-13 DIAGNOSIS — O02.1 IUFD AT LESS THAN 20 WEEKS OF GESTATION: ICD-10-CM

## 2021-10-13 LAB
GLUCOSE 1H P 75 G GLC PO SERPL-MCNC: 146 MG/DL (ref 65–179)
GLUCOSE 2H P 75 G GLC PO SERPL-MCNC: 103 MG/DL (ref 65–154)
GLUCOSE P FAST SERPL-MCNC: 82 MG/DL (ref 65–94)
GLUCOSE UR STRIP-MCNC: NEGATIVE MG/DL
HCT VFR BLD AUTO: 35.5 % (ref 34–46.6)
HGB BLD-MCNC: 12 G/DL (ref 12–15.9)
PROT UR STRIP-MCNC: NEGATIVE MG/DL

## 2021-10-13 PROCEDURE — 90471 IMMUNIZATION ADMIN: CPT | Performed by: NURSE PRACTITIONER

## 2021-10-13 PROCEDURE — 99214 OFFICE O/P EST MOD 30 MIN: CPT | Performed by: NURSE PRACTITIONER

## 2021-10-13 PROCEDURE — 90686 IIV4 VACC NO PRSV 0.5 ML IM: CPT | Performed by: NURSE PRACTITIONER

## 2021-10-13 RX ORDER — LANSOPRAZOLE 30 MG/1
30 CAPSULE, DELAYED RELEASE ORAL DAILY
Qty: 30 CAPSULE | Refills: 3 | Status: SHIPPED | OUTPATIENT
Start: 2021-10-13 | End: 2022-10-13

## 2021-10-13 NOTE — PROGRESS NOTES
OB follow up     CC:  Here for prenatal follow up    Syl Patel is a 25 y.o.  28w0d being seen today for her obstetrical visit.  She is doing her 2hr GTT today.   Review of Systems  Consitutional:  Neg fatigue  Cardiovascular: neg edema  Gastrointestinal: neg n/v  Genitourinary: Neg for cramping, vaginal bleeding, SROM, or dysuria.       /78   Wt 108 kg (238 lb)   LMP 2020 (Exact Date)   BMI 38.41 kg/m²     FHT: present 141 BPM   Uterine Size: size equals dates   Assessment    1) Pregnancy at 28w0d- 2hr GTT. Rh +.     2) Subchorionic hemorrhage- Resolved.     3) Previous  x 2- Plan repeat @ 39 weeks.      4) H/O IUFD- @ 17.4 weeks.  Trisomy 21 with large cystic hygroma.      5) Family history of MCAD (medium-chain acetyl COA dehydrogenase) -  Pt is + carrier.  FOB testing is neg (Krystal looked up results on [employer] website).      6) Smoker- Patient was counseled for 3  minutes regarding effects of smoking in pregnancy, including risk of poor growth,  birth and stillbirth as well as an increase in asthma and ear infections in infancy.  Patient did voice a willingness to quit and information was given for review. She is currently smoking < 1/2 PPD of cigarrettes.  She is unwilling to set a quit date but says that she will try to smoke less     10) Pap NIL - Plan pap at postpartum.      11) COVID 19- COVID19 precautions were reviewed with the patient. Continue to encourage social distancing, wearing a mask, and good hand hygiene. She is not working outside of the home. Recommend maternity leave @ 36 weeks gestation if working outside the home. I wore a mask and gloves during this patient encounter.  Patient also wearing a surgical mask.  Hand hygeine performed before and after seeing the patient. Declines vaccine info.      13) Enlarged thyroid- Thyroid labs normal. Normal thyroid US.      14) Heartburn- Taking prevacid with good relief. Requesting refill of medication.      15) Flu vaccine- Encouraged the flu vaccine during pregnancy. Discuss normal physiological changes during pregnancy increase the susceptibility of the flu virus and increase the risk of severe illness for the pregnant woman. Disc flu can be harmful to the unborn baby as well. Enc the flu vaccine. Disc with patient that getting the flu vaccine is the first and most important step in protecting against the flu. Flu vaccines given during pregnancy help protect both the mother and the baby. Getting the flu vaccine during pregnancy also helps protect the  from flu illness for several months after their birth, when then are too young to get vaccinated. Also disc the importance of good hand hygiene and avoiding people who are sick. The patient DESIRES the flu vaccine today.     16) Tdap- Disc that all pregnant women should get a Tdap shot in the third trimester, preferably between 27 weeks and 36 weeks of pregnancy. The Tdap shot is an effective and safe way to protect the baby from serious illness and complications of pertussis. Recommend that partners, family members, and infant caregivers should be up to date on theTdap vaccine if they have not previously been vaccinated. Ideally, all family members should be vaccinated at least 2 weeks before coming in contact with the . If not administered during pregnancy, the Tdap vaccine should be given immediately postpartum if the patient is not UTD on Tdap.        Plan    Continue prenatal vitamins   Reviewed this stage of pregnancy  Problem list updated   Follow up in 4 weeks     ROSS Forrester  10/13/2021  10:47 EDT

## 2021-10-28 ENCOUNTER — ROUTINE PRENATAL (OUTPATIENT)
Dept: OBSTETRICS AND GYNECOLOGY | Facility: CLINIC | Age: 26
End: 2021-10-28

## 2021-10-28 VITALS — BODY MASS INDEX: 39.54 KG/M2 | SYSTOLIC BLOOD PRESSURE: 126 MMHG | DIASTOLIC BLOOD PRESSURE: 72 MMHG | WEIGHT: 245 LBS

## 2021-10-28 DIAGNOSIS — E04.9 ENLARGED THYROID: ICD-10-CM

## 2021-10-28 DIAGNOSIS — O35.8XX0 CYSTIC HYGROMA OF FETUS IN SINGLETON PREGNANCY: ICD-10-CM

## 2021-10-28 DIAGNOSIS — E71.311 MCAD (MEDIUM-CHAIN ACYL-COA DEHYDROGENASE DEFICIENCY) (HCC): Primary | ICD-10-CM

## 2021-10-28 DIAGNOSIS — Z98.891 PREVIOUS CESAREAN SECTION: ICD-10-CM

## 2021-10-28 DIAGNOSIS — F17.200 SMOKER: ICD-10-CM

## 2021-10-28 DIAGNOSIS — O36.8130 DECREASED FETAL MOVEMENTS IN THIRD TRIMESTER, SINGLE OR UNSPECIFIED FETUS: ICD-10-CM

## 2021-10-28 DIAGNOSIS — Z23 NEED FOR TDAP VACCINATION: ICD-10-CM

## 2021-10-28 DIAGNOSIS — Z34.90 PREGNANCY, UNSPECIFIED GESTATIONAL AGE: ICD-10-CM

## 2021-10-28 LAB
GLUCOSE UR STRIP-MCNC: NEGATIVE MG/DL
PROT UR STRIP-MCNC: NEGATIVE MG/DL

## 2021-10-28 PROCEDURE — 99214 OFFICE O/P EST MOD 30 MIN: CPT | Performed by: OBSTETRICS & GYNECOLOGY

## 2021-10-28 NOTE — PROGRESS NOTES
"OB follow up     Syl Patel is a 25 y.o.  30w1d being seen today for her obstetrical visit.  Patient reports no complaints. Fetal movement: decreased. Her baby is moving within the normal parameters, just \"less than it used to\"    Review of Systems  No bleeding, No cramping/contractions     /72   Wt 111 kg (245 lb)   LMP 2020 (Exact Date)   BMI 39.54 kg/m²     FHT: 132 BPM   Uterine Size: 32  cm       Assessment/Plan:    1) 25 y.o.  -pregnancy at 30w1d- normal 2hr GTT. Rh +.      2) Decreased fetal movement- US today  Shows breech fetus, BPP . VINCENZO= 132, US compared to scan on 2021.      3) Previous  x 2- Plan repeat @ 39 weeks. Undecided about contraception     4) H/O IUFD- @ 17.4 weeks.  Trisomy 21 with large cystic hygroma.      5) Family history of MCAD (medium-chain acetyl COA dehydrogenase) -  Pt is + carrier.  FOB testing is neg (Krystal looked up results on [employer] website).      6) Smoker- Patient was counseled for 3  minutes regarding effects of smoking in pregnancy, including risk of poor growth,  birth and stillbirth as well as an increase in asthma and ear infections in infancy.  Patient did voice a willingness to quit and information was given for review. She is currently smoking < 1/2 PPD of cigarrettes.       10) S>D- check US in late third trimester.      11) COVID 19- COVID19 precautions were reviewed with the patient. Continue to encourage social distancing, wearing a mask, and good hand hygiene. She is not working outside of the home. Recommend maternity leave @ 36 weeks gestation if working outside the home. I wore a mask and gloves during this patient encounter.  Patient also wearing a surgical mask.  Hand hygeine performed before and after seeing the patient. Declines vaccine info.      13) Enlarged thyroid- Thyroid labs normal. Normal thyroid US.      14) Heartburn- Taking prevacid with good relief.      15) Flu vaccine- s/p flu vaccine "      16) Tdap- Patient advised to have the Tdap shot in the later part of pregnancy to help protect against whooping cough.  Also advised that FOB and other adults who come in contact with the infant should also be vaccinated with Tdap.  Vaccine given today.      17)Reviewed this stage of pregnancy    18)Problem list updated     19) RTO 2 weeks OBT.     20) Time Spent: I spent > 30 minutes caring for Syl on this date of service. This time includes time spent by me in the following activities: preparing for the visit, reviewing tests, obtaining and/or reviewing a separately obtained history, performing a medically appropriate examination and/or evaluation, counseling and educating the patient/family/caregiver, ordering medications, tests, or procedures, documenting information in the medical record and independently interpreting results and communicating that information with the patient/family/caregiver.         Saritha Richardson MD    10/28/2021  11:28 EDT

## 2021-11-12 ENCOUNTER — ROUTINE PRENATAL (OUTPATIENT)
Dept: OBSTETRICS AND GYNECOLOGY | Facility: CLINIC | Age: 26
End: 2021-11-12

## 2021-11-12 VITALS — WEIGHT: 252 LBS | DIASTOLIC BLOOD PRESSURE: 74 MMHG | BODY MASS INDEX: 40.67 KG/M2 | SYSTOLIC BLOOD PRESSURE: 122 MMHG

## 2021-11-12 DIAGNOSIS — Z3A.32 32 WEEKS GESTATION OF PREGNANCY: Primary | ICD-10-CM

## 2021-11-12 LAB
GLUCOSE UR STRIP-MCNC: NEGATIVE MG/DL
PROT UR STRIP-MCNC: NEGATIVE MG/DL

## 2021-11-12 PROCEDURE — 99213 OFFICE O/P EST LOW 20 MIN: CPT | Performed by: OBSTETRICS & GYNECOLOGY

## 2021-11-12 NOTE — PROGRESS NOTES
OB follow up     Chief Complaint: [facility] FU    Syl Patel is a 25 y.o.  32w2d being seen today for her obstetrical visit.  Patient reports no complaints. Fetal movement: normal.  Medical problems reviewed.  Patient states that she is still taking Prevacid daily for heartburn.  She reported decreased fetal movement 2 weeks ago but states that her baby is moving well now.  Patient has had 2 prior  section and is prepared for her third  section.    Review of Systems  No bleeding, No cramping/contractions     /74   Wt 114 kg (252 lb)   LMP 2020 (Exact Date)   BMI 40.67 kg/m²     FHT:   present   Uterine Size:           Assessment/Plan: Low risk pregnancy    1) pregnancy at 32w2d     2) Previous  x 2- Plan repeat @ 39 weeks. Undecided about contraception     3) H/O IUFD- @ 17.4 weeks.  Trisomy 21 with large cystic hygroma.      4) Family history of MCAD (medium-chain acetyl COA dehydrogenase) -  Pt is + carrier.  FOB testing is neg (Krystal looked up results on [employer] website).      5) Smoker- Patient has been previously counseled. She is still smoking less than 1/2 PPD of cigarrettes.       6) S>D- check US in late third trimester.      7) COVID 19- COVID19 precautions were reviewed with the patient. Continue to encourage social distancing, wearing a mask, and good hand hygiene. She is not working outside of the home. Recommend maternity leave @ 36 weeks gestation if working outside the home. I wore a mask and gloves during this patient encounter.  Patient also wearing a surgical mask.  Hand hygeine performed before and after seeing the patient. Declines vaccine info.      8) Enlarged thyroid- Thyroid labs normal. Normal thyroid US.      9) Heartburn- Taking prevacid with good relief.      10)  s/p tDap and s/p flu vaccine.         Reviewed this stage of pregnancy  Problem list updated   No follow-ups on file.      Taryn Viveros DO    2021  10:15 EST

## 2021-12-01 ENCOUNTER — ROUTINE PRENATAL (OUTPATIENT)
Dept: OBSTETRICS AND GYNECOLOGY | Facility: CLINIC | Age: 26
End: 2021-12-01

## 2021-12-01 ENCOUNTER — TELEPHONE (OUTPATIENT)
Dept: OBSTETRICS AND GYNECOLOGY | Facility: CLINIC | Age: 26
End: 2021-12-01

## 2021-12-01 VITALS — SYSTOLIC BLOOD PRESSURE: 130 MMHG | BODY MASS INDEX: 40.84 KG/M2 | DIASTOLIC BLOOD PRESSURE: 82 MMHG | WEIGHT: 253 LBS

## 2021-12-01 DIAGNOSIS — Z34.93 PRENATAL CARE IN THIRD TRIMESTER: Primary | ICD-10-CM

## 2021-12-01 DIAGNOSIS — O35.8XX0 CYSTIC HYGROMA OF FETUS IN SINGLETON PREGNANCY: ICD-10-CM

## 2021-12-01 LAB
GLUCOSE UR STRIP-MCNC: NEGATIVE MG/DL
PROT UR STRIP-MCNC: NEGATIVE MG/DL

## 2021-12-01 PROCEDURE — 99213 OFFICE O/P EST LOW 20 MIN: CPT | Performed by: NURSE PRACTITIONER

## 2021-12-01 NOTE — PROGRESS NOTES
OB follow up     Chief Complaint: [facility] FU    Syl Patel is a 26 y.o.  35w0d being seen today for her obstetrical visit.  Patient reports no complaints. Fetal movement: normal.    Review of Systems  No bleeding, No cramping/contractions     /82   Wt 115 kg (253 lb)   LMP 2020 (Exact Date)   BMI 40.84 kg/m²     FHT:   present    Uterine Size:  38cm          Assessment/Plan: Low risk pregnancy    1) pregnancy at 35w0d     2) Previous  x 2- Plan repeat @ 39 weeks. Undecided about contraception     3) H/O IUFD- @ 17.4 weeks. Trisomy 21 with large cystic hygroma.      4) Family history of MCAD (medium-chain acetyl COA dehydrogenase) -  Pt is + carrier.  FOB testing is neg (Krystal looked up results on [employer] website).      5) Smoker- Patient has been previously counseled. She is still smoking less than 1/2 PPD of cigarrettes.  Continue to encourage cessation.      6) S>D-      7) COVID 19- COVID19 precautions were reviewed with the patient. Continue to encourage social distancing, wearing a mask, and good hand hygiene. She is not working outside of the home. Recommend maternity leave @ 36 weeks gestation if working outside the home. I wore a mask and gloves during this patient encounter.  Patient also wearing a surgical mask.  Hand hygeine performed before and after seeing the patient. Declines vaccine info.      8) Enlarged thyroid- Thyroid labs normal. Normal thyroid US.      9) Heartburn- Taking prevacid with good relief.      10)  s/p tDap and s/p flu vaccine.     11) Female. Breast and bottle. Pediatrician list provided today because pt is undecided (has peds in [place] but recently moved to [place]).     Reviewed this stage of pregnancy  Problem list updated   RTO 1 week for OB internal, GBS and Growth US (S>D)     Trish Sahu, APRN  2021  14:11 EST

## 2021-12-08 ENCOUNTER — ROUTINE PRENATAL (OUTPATIENT)
Dept: OBSTETRICS AND GYNECOLOGY | Facility: CLINIC | Age: 26
End: 2021-12-08

## 2021-12-08 VITALS — DIASTOLIC BLOOD PRESSURE: 88 MMHG | BODY MASS INDEX: 41 KG/M2 | WEIGHT: 254 LBS | SYSTOLIC BLOOD PRESSURE: 124 MMHG

## 2021-12-08 DIAGNOSIS — Z98.891 PREVIOUS CESAREAN SECTION: ICD-10-CM

## 2021-12-08 DIAGNOSIS — Z34.93 PRENATAL CARE IN THIRD TRIMESTER: ICD-10-CM

## 2021-12-08 DIAGNOSIS — Z36.85 ENCOUNTER FOR ANTENATAL SCREENING FOR STREPTOCOCCUS B: Primary | ICD-10-CM

## 2021-12-08 DIAGNOSIS — F17.200 SMOKER: ICD-10-CM

## 2021-12-08 DIAGNOSIS — E71.311 MCAD (MEDIUM-CHAIN ACYL-COA DEHYDROGENASE DEFICIENCY) (HCC): ICD-10-CM

## 2021-12-08 PROBLEM — Z23 NEED FOR TDAP VACCINATION: Status: RESOLVED | Noted: 2021-10-28 | Resolved: 2021-12-08

## 2021-12-08 LAB
GLUCOSE UR STRIP-MCNC: NEGATIVE MG/DL
PROT UR STRIP-MCNC: NEGATIVE MG/DL

## 2021-12-08 PROCEDURE — 99214 OFFICE O/P EST MOD 30 MIN: CPT | Performed by: OBSTETRICS & GYNECOLOGY

## 2021-12-08 PROCEDURE — 90471 IMMUNIZATION ADMIN: CPT | Performed by: OBSTETRICS & GYNECOLOGY

## 2021-12-08 PROCEDURE — 90715 TDAP VACCINE 7 YRS/> IM: CPT | Performed by: OBSTETRICS & GYNECOLOGY
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
## 2021-12-10 LAB — GP B STREP DNA SPEC QL NAA+PROBE: NEGATIVE

## 2021-12-13 ENCOUNTER — PREP FOR SURGERY (OUTPATIENT)
Dept: OTHER | Facility: HOSPITAL | Age: 26
End: 2021-12-13

## 2021-12-13 DIAGNOSIS — Z98.891 PREVIOUS CESAREAN SECTION: Primary | ICD-10-CM

## 2021-12-13 RX ORDER — LIDOCAINE HYDROCHLORIDE 10 MG/ML
5 INJECTION, SOLUTION EPIDURAL; INFILTRATION; INTRACAUDAL; PERINEURAL AS NEEDED
Status: CANCELLED | OUTPATIENT
Start: 2021-12-13

## 2021-12-13 RX ORDER — SODIUM CHLORIDE 0.9 % (FLUSH) 0.9 %
1-10 SYRINGE (ML) INJECTION AS NEEDED
Status: CANCELLED | OUTPATIENT
Start: 2021-12-13

## 2021-12-13 RX ORDER — MISOPROSTOL 200 UG/1
800 TABLET ORAL AS NEEDED
Status: CANCELLED | OUTPATIENT
Start: 2021-12-13

## 2021-12-13 RX ORDER — CARBOPROST TROMETHAMINE 250 UG/ML
250 INJECTION, SOLUTION INTRAMUSCULAR AS NEEDED
Status: CANCELLED | OUTPATIENT
Start: 2021-12-13

## 2021-12-13 RX ORDER — SODIUM CHLORIDE, SODIUM LACTATE, POTASSIUM CHLORIDE, CALCIUM CHLORIDE 600; 310; 30; 20 MG/100ML; MG/100ML; MG/100ML; MG/100ML
125 INJECTION, SOLUTION INTRAVENOUS CONTINUOUS
Status: CANCELLED | OUTPATIENT
Start: 2021-12-13

## 2021-12-13 RX ORDER — SODIUM CHLORIDE 0.9 % (FLUSH) 0.9 %
10 SYRINGE (ML) INJECTION EVERY 12 HOURS SCHEDULED
Status: CANCELLED | OUTPATIENT
Start: 2021-12-13

## 2021-12-13 RX ORDER — METHYLERGONOVINE MALEATE 0.2 MG/ML
200 INJECTION INTRAVENOUS ONCE AS NEEDED
Status: CANCELLED | OUTPATIENT
Start: 2021-12-13

## 2021-12-14 ENCOUNTER — ROUTINE PRENATAL (OUTPATIENT)
Dept: OBSTETRICS AND GYNECOLOGY | Facility: CLINIC | Age: 26
End: 2021-12-14

## 2021-12-14 VITALS — WEIGHT: 255 LBS | DIASTOLIC BLOOD PRESSURE: 86 MMHG | BODY MASS INDEX: 41.16 KG/M2 | SYSTOLIC BLOOD PRESSURE: 130 MMHG

## 2021-12-14 DIAGNOSIS — Z98.891 PREVIOUS CESAREAN SECTION: Primary | ICD-10-CM

## 2021-12-14 DIAGNOSIS — O41.8X10 SUBCHORIONIC HEMATOMA IN FIRST TRIMESTER, SINGLE OR UNSPECIFIED FETUS: ICD-10-CM

## 2021-12-14 DIAGNOSIS — O35.8XX0 CYSTIC HYGROMA OF FETUS IN SINGLETON PREGNANCY: ICD-10-CM

## 2021-12-14 DIAGNOSIS — O46.8X1 SUBCHORIONIC HEMATOMA IN FIRST TRIMESTER, SINGLE OR UNSPECIFIED FETUS: ICD-10-CM

## 2021-12-14 LAB
GLUCOSE UR STRIP-MCNC: NEGATIVE MG/DL
PROT UR STRIP-MCNC: NEGATIVE MG/DL

## 2021-12-14 PROCEDURE — 99212 OFFICE O/P EST SF 10 MIN: CPT | Performed by: OBSTETRICS & GYNECOLOGY

## 2021-12-14 NOTE — PROGRESS NOTES
OB follow up     Syl Patel is a 26 y.o.  36w6d being seen today for her obstetrical visit.  Patient reports no bleeding, no contractions and no leaking. Fetal movement: normal.  Prenatal care complicated by two previous C-sections.  She also had a prior IUFD with cystic hygroma.  She plans to repeat low-transverse  section at term.  GBS was collected last visit.  She did have GERD but has been able to stop her Prevacid and that has resolved.  Review of Systems  No bleeding, No cramping/contractions     /86   Wt 116 kg (255 lb)   LMP 2020 (Exact Date)   BMI 41.16 kg/m²     FHT:   BPM   Uterine Size:     GBS was reviewed with patient negative.    Assessment/Plan:    1) 26 y.o.  -pregnancy at 36w6d    2)   Encounter Diagnoses   Name Primary?   • Previous  section x 2, Plan RLTCS Yes   • prior IUFD w/ Cystic hygroma & retained placenta    • Subchorionic hematoma in first trimester, single or unspecified fetus    Plan repeat low-transverse  at 39 weeks.  Labor warnings given.  GBS results relayed to patient.    3) Reviewed this stage of pregnancy  4) Problem list updated     Return in about 1 week (around 2021) for OB INT.      Liam Dang MD    2021  13:20 EST

## 2021-12-21 ENCOUNTER — ROUTINE PRENATAL (OUTPATIENT)
Dept: OBSTETRICS AND GYNECOLOGY | Facility: CLINIC | Age: 26
End: 2021-12-21

## 2021-12-21 VITALS — WEIGHT: 258.6 LBS | BODY MASS INDEX: 41.74 KG/M2 | DIASTOLIC BLOOD PRESSURE: 58 MMHG | SYSTOLIC BLOOD PRESSURE: 118 MMHG

## 2021-12-21 DIAGNOSIS — F17.200 SMOKER: ICD-10-CM

## 2021-12-21 DIAGNOSIS — Z98.891 PREVIOUS CESAREAN SECTION: Primary | ICD-10-CM

## 2021-12-21 DIAGNOSIS — E71.311 MCAD (MEDIUM-CHAIN ACYL-COA DEHYDROGENASE DEFICIENCY) (HCC): ICD-10-CM

## 2021-12-21 LAB
GLUCOSE UR STRIP-MCNC: NEGATIVE MG/DL
PROT UR STRIP-MCNC: NEGATIVE MG/DL

## 2021-12-21 PROCEDURE — 99213 OFFICE O/P EST LOW 20 MIN: CPT | Performed by: OBSTETRICS & GYNECOLOGY
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
## 2021-12-27 ENCOUNTER — ROUTINE PRENATAL (OUTPATIENT)
Dept: OBSTETRICS AND GYNECOLOGY | Facility: CLINIC | Age: 26
End: 2021-12-27

## 2021-12-27 VITALS — SYSTOLIC BLOOD PRESSURE: 124 MMHG | WEIGHT: 255 LBS | BODY MASS INDEX: 41.16 KG/M2 | DIASTOLIC BLOOD PRESSURE: 82 MMHG

## 2021-12-27 DIAGNOSIS — E04.9 ENLARGED THYROID: Primary | ICD-10-CM

## 2021-12-27 DIAGNOSIS — Z34.90 PREGNANCY, UNSPECIFIED GESTATIONAL AGE: ICD-10-CM

## 2021-12-27 DIAGNOSIS — E71.311 MCAD (MEDIUM-CHAIN ACYL-COA DEHYDROGENASE DEFICIENCY) (HCC): ICD-10-CM

## 2021-12-27 DIAGNOSIS — O26.843 FUNDAL HEIGHT LOW FOR DATES IN THIRD TRIMESTER: ICD-10-CM

## 2021-12-27 DIAGNOSIS — Z34.93 PRENATAL CARE IN THIRD TRIMESTER: ICD-10-CM

## 2021-12-27 DIAGNOSIS — F17.200 SMOKER: ICD-10-CM

## 2021-12-27 DIAGNOSIS — O35.8XX0 CYSTIC HYGROMA OF FETUS IN SINGLETON PREGNANCY: ICD-10-CM

## 2021-12-27 DIAGNOSIS — Z98.891 PREVIOUS CESAREAN SECTION: ICD-10-CM

## 2021-12-27 LAB
GLUCOSE UR STRIP-MCNC: NEGATIVE MG/DL
PROT UR STRIP-MCNC: NEGATIVE MG/DL

## 2021-12-27 PROCEDURE — 99214 OFFICE O/P EST MOD 30 MIN: CPT | Performed by: OBSTETRICS & GYNECOLOGY

## 2021-12-27 NOTE — PROGRESS NOTES
OB follow up      Syl Patel is a 26 y.o.  38w5d being seen today for her obstetrical visit.  Patient reports no complaints. Fetal movement: normal. She does not want to be checked today. Her C/S is scheduled for this Wednesday. She remains undecided about contraception.     Review of Systems  No bleeding, No cramping/contractions     /82   Wt 116 kg (255 lb)   LMP 2020 (Exact Date)   BMI 41.16 kg/m²     FHT: 146 BPM   Uterine Size: 36  cm       Assessment/Plan:    1) 26 y.o.  -pregnancy at 38w5d- GBS negative.     2)Previous  x 2- Plan repeat @ 39 weeks, this Wednesday.. Undecided about contraception     3) H/O IUFD- @ 17.4 weeks. Trisomy 21 with large cystic hygroma. Her NIPT was low risk in this pregnancy.     4) Family history of MCAD (medium-chain acetyl COA dehydrogenase) -  Pt is + carrier.  FOB testing is neg.      5) Smoker- Patient has been previously counseled. She is still smoking less than 1/2 PPD of cigarrettes.  Continue to encourage cessation.      6) S<D- US growth on 2021 shows overall growth in the 42nd percentile (6.1 pounds)     7) COVID 19- COVID19 precautions were reviewed with the patient. Continue to encourage social distancing, wearing a mask, and good hand hygiene. She is not working outside of the home. Recommend maternity leave @ 36 weeks gestation if working outside the home. I wore a mask and gloves during this patient encounter.  Patient also wearing a surgical mask.  Hand hygeine performed before and after seeing the patient. Declines vaccine info.      8) Enlarged thyroid- Thyroid labs normal. Normal thyroid US.      9) Heartburn- Taking prevacid with good relief.      10)  s/p tDap and s/p flu vaccine.     11)Reviewed this stage of pregnancy    12)Problem list updated         Saritha Richardson MD    2021  15:25 EST

## 2021-12-28 PROBLEM — O26.849 FUNDAL HEIGHT LOW FOR DATES: Status: ACTIVE | Noted: 2021-12-28

## 2021-12-29 ENCOUNTER — ANESTHESIA (OUTPATIENT)
Dept: OBSTETRICS AND GYNECOLOGY | Facility: HOSPITAL | Age: 26
End: 2021-12-29

## 2021-12-29 ENCOUNTER — HOSPITAL ENCOUNTER (INPATIENT)
Facility: HOSPITAL | Age: 26
LOS: 2 days | Discharge: HOME | End: 2021-12-31
Attending: OBSTETRICS & GYNECOLOGY | Admitting: OBSTETRICS & GYNECOLOGY

## 2021-12-29 ENCOUNTER — ANESTHESIA EVENT (OUTPATIENT)
Dept: OBSTETRICS AND GYNECOLOGY | Facility: HOSPITAL | Age: 26
End: 2021-12-29

## 2021-12-29 DIAGNOSIS — Z98.891 PREVIOUS CESAREAN SECTION: ICD-10-CM

## 2021-12-29 PROBLEM — Z34.93 PRENATAL CARE IN THIRD TRIMESTER: Status: RESOLVED | Noted: 2021-12-01 | Resolved: 2021-12-29

## 2021-12-29 LAB
ABO GROUP BLD: NORMAL
AMPHET+METHAMPHET UR QL: NEGATIVE
AMPHETAMINES UR QL: NEGATIVE
BARBITURATES UR QL SCN: NEGATIVE
BENZODIAZ UR QL SCN: NEGATIVE
BILIRUB UR QL STRIP: NEGATIVE
BLD GP AB SCN SERPL QL: NEGATIVE
BUPRENORPHINE SERPL-MCNC: NEGATIVE NG/ML
CANNABINOIDS SERPL QL: NEGATIVE
CLARITY UR: CLEAR
COCAINE UR QL: NEGATIVE
COLOR UR: YELLOW
DEPRECATED RDW RBC AUTO: 44.3 FL (ref 37–54)
ERYTHROCYTE [DISTWIDTH] IN BLOOD BY AUTOMATED COUNT: 13 % (ref 12.3–15.4)
GLUCOSE UR STRIP-MCNC: NEGATIVE MG/DL
HCT VFR BLD AUTO: 37 % (ref 34–46.6)
HGB BLD-MCNC: 12 G/DL (ref 12–15.9)
HGB UR QL STRIP.AUTO: NEGATIVE
KETONES UR QL STRIP: NEGATIVE
LEUKOCYTE ESTERASE UR QL STRIP.AUTO: NEGATIVE
MCH RBC QN AUTO: 30.3 PG (ref 26.6–33)
MCHC RBC AUTO-ENTMCNC: 32.4 G/DL (ref 31.5–35.7)
MCV RBC AUTO: 93.4 FL (ref 79–97)
METHADONE UR QL SCN: NEGATIVE
NITRITE UR QL STRIP: NEGATIVE
OPIATES UR QL: NEGATIVE
OXYCODONE UR QL SCN: NEGATIVE
PCP UR QL SCN: NEGATIVE
PH UR STRIP.AUTO: 6 [PH] (ref 4.5–8)
PLATELET # BLD AUTO: 228 10*3/MM3 (ref 140–450)
PMV BLD AUTO: 10.9 FL (ref 6–12)
PROPOXYPH UR QL: NEGATIVE
PROT UR QL STRIP: NEGATIVE
RBC # BLD AUTO: 3.96 10*6/MM3 (ref 3.77–5.28)
RH BLD: POSITIVE
SARS-COV-2 RNA PNL SPEC NAA+PROBE: NOT DETECTED
SP GR UR STRIP: 1.03 (ref 1–1.03)
T&S EXPIRATION DATE: NORMAL
TRICYCLICS UR QL SCN: NEGATIVE
UROBILINOGEN UR QL STRIP: NORMAL
WBC NRBC COR # BLD: 15.35 10*3/MM3 (ref 3.4–10.8)

## 2021-12-29 PROCEDURE — 25010000002 MORPHINE PER 10 MG: Performed by: NURSE ANESTHETIST, CERTIFIED REGISTERED

## 2021-12-29 PROCEDURE — 86901 BLOOD TYPING SEROLOGIC RH(D): CPT | Performed by: OBSTETRICS & GYNECOLOGY

## 2021-12-29 PROCEDURE — 80306 DRUG TEST PRSMV INSTRMNT: CPT | Performed by: OBSTETRICS & GYNECOLOGY

## 2021-12-29 PROCEDURE — 59514 CESAREAN DELIVERY ONLY: CPT | Performed by: SPECIALIST/TECHNOLOGIST, OTHER

## 2021-12-29 PROCEDURE — 25010000002 PHENYLEPHRINE 10 MG/ML SOLUTION: Performed by: NURSE ANESTHETIST, CERTIFIED REGISTERED

## 2021-12-29 PROCEDURE — 81003 URINALYSIS AUTO W/O SCOPE: CPT | Performed by: OBSTETRICS & GYNECOLOGY

## 2021-12-29 PROCEDURE — 86850 RBC ANTIBODY SCREEN: CPT | Performed by: OBSTETRICS & GYNECOLOGY

## 2021-12-29 PROCEDURE — 59515 CESAREAN DELIVERY: CPT | Performed by: OBSTETRICS & GYNECOLOGY

## 2021-12-29 PROCEDURE — 25010000002 FENTANYL CITRATE (PF) 50 MCG/ML SOLUTION: Performed by: NURSE ANESTHETIST, CERTIFIED REGISTERED

## 2021-12-29 PROCEDURE — 25010000002 KETOROLAC TROMETHAMINE PER 15 MG: Performed by: OBSTETRICS & GYNECOLOGY

## 2021-12-29 PROCEDURE — 86900 BLOOD TYPING SEROLOGIC ABO: CPT | Performed by: OBSTETRICS & GYNECOLOGY

## 2021-12-29 PROCEDURE — 25010000002 ONDANSETRON PER 1 MG: Performed by: NURSE ANESTHETIST, CERTIFIED REGISTERED

## 2021-12-29 PROCEDURE — 94799 UNLISTED PULMONARY SVC/PX: CPT

## 2021-12-29 PROCEDURE — 25010000002 DIPHENHYDRAMINE PER 50 MG: Performed by: NURSE ANESTHETIST, CERTIFIED REGISTERED

## 2021-12-29 PROCEDURE — 0 CEFAZOLIN SODIUM-DEXTROSE 2-3 GM-%(50ML) RECONSTITUTED SOLUTION: Performed by: OBSTETRICS & GYNECOLOGY

## 2021-12-29 PROCEDURE — 87635 SARS-COV-2 COVID-19 AMP PRB: CPT | Performed by: OBSTETRICS & GYNECOLOGY

## 2021-12-29 PROCEDURE — 25010000002 KETOROLAC TROMETHAMINE PER 15 MG: Performed by: NURSE ANESTHETIST, CERTIFIED REGISTERED

## 2021-12-29 PROCEDURE — 85027 COMPLETE CBC AUTOMATED: CPT | Performed by: OBSTETRICS & GYNECOLOGY

## 2021-12-29 RX ORDER — OXYTOCIN/0.9 % SODIUM CHLORIDE 30/500 ML
650 PLASTIC BAG, INJECTION (ML) INTRAVENOUS ONCE
Status: COMPLETED | OUTPATIENT
Start: 2021-12-29 | End: 2021-12-29

## 2021-12-29 RX ORDER — DIPHENHYDRAMINE HYDROCHLORIDE 50 MG/ML
INJECTION INTRAMUSCULAR; INTRAVENOUS AS NEEDED
Status: DISCONTINUED | OUTPATIENT
Start: 2021-12-29 | End: 2021-12-29 | Stop reason: SURG

## 2021-12-29 RX ORDER — KETOROLAC TROMETHAMINE 30 MG/ML
INJECTION, SOLUTION INTRAMUSCULAR; INTRAVENOUS AS NEEDED
Status: DISCONTINUED | OUTPATIENT
Start: 2021-12-29 | End: 2021-12-29 | Stop reason: SURG

## 2021-12-29 RX ORDER — OXYTOCIN/0.9 % SODIUM CHLORIDE 30/500 ML
85 PLASTIC BAG, INJECTION (ML) INTRAVENOUS ONCE
Status: COMPLETED | OUTPATIENT
Start: 2021-12-29 | End: 2021-12-29

## 2021-12-29 RX ORDER — DIPHENHYDRAMINE HCL 25 MG
25 CAPSULE ORAL EVERY 4 HOURS PRN
Status: DISCONTINUED | OUTPATIENT
Start: 2021-12-29 | End: 2021-12-31 | Stop reason: HOSPADM

## 2021-12-29 RX ORDER — OXYTOCIN/0.9 % SODIUM CHLORIDE 30/500 ML
650 PLASTIC BAG, INJECTION (ML) INTRAVENOUS ONCE
Status: DISCONTINUED | OUTPATIENT
Start: 2021-12-29 | End: 2021-12-31 | Stop reason: HOSPADM

## 2021-12-29 RX ORDER — MORPHINE SULFATE 1 MG/ML
INJECTION, SOLUTION EPIDURAL; INTRATHECAL; INTRAVENOUS AS NEEDED
Status: DISCONTINUED | OUTPATIENT
Start: 2021-12-29 | End: 2021-12-29 | Stop reason: SURG

## 2021-12-29 RX ORDER — OXYTOCIN/0.9 % SODIUM CHLORIDE 30/500 ML
PLASTIC BAG, INJECTION (ML) INTRAVENOUS
Status: COMPLETED
Start: 2021-12-29 | End: 2021-12-29

## 2021-12-29 RX ORDER — OXYCODONE HYDROCHLORIDE AND ACETAMINOPHEN 5; 325 MG/1; MG/1
1 TABLET ORAL EVERY 4 HOURS PRN
Status: DISCONTINUED | OUTPATIENT
Start: 2021-12-30 | End: 2021-12-31 | Stop reason: HOSPADM

## 2021-12-29 RX ORDER — PROMETHAZINE HYDROCHLORIDE 25 MG/1
12.5 SUPPOSITORY RECTAL EVERY 6 HOURS PRN
Status: DISCONTINUED | OUTPATIENT
Start: 2021-12-29 | End: 2021-12-31 | Stop reason: HOSPADM

## 2021-12-29 RX ORDER — METHYLERGONOVINE MALEATE 0.2 MG/ML
200 INJECTION INTRAVENOUS ONCE AS NEEDED
Status: DISCONTINUED | OUTPATIENT
Start: 2021-12-29 | End: 2021-12-31 | Stop reason: HOSPADM

## 2021-12-29 RX ORDER — PHENYLEPHRINE HYDROCHLORIDE 10 MG/ML
INJECTION INTRAVENOUS AS NEEDED
Status: DISCONTINUED | OUTPATIENT
Start: 2021-12-29 | End: 2021-12-29 | Stop reason: SURG

## 2021-12-29 RX ORDER — OXYCODONE AND ACETAMINOPHEN 10; 325 MG/1; MG/1
1 TABLET ORAL EVERY 4 HOURS PRN
Status: DISPENSED | OUTPATIENT
Start: 2021-12-29 | End: 2021-12-30

## 2021-12-29 RX ORDER — ONDANSETRON 2 MG/ML
4 INJECTION INTRAMUSCULAR; INTRAVENOUS EVERY 6 HOURS PRN
Status: DISCONTINUED | OUTPATIENT
Start: 2021-12-29 | End: 2021-12-31 | Stop reason: HOSPADM

## 2021-12-29 RX ORDER — ONDANSETRON 2 MG/ML
4 INJECTION INTRAMUSCULAR; INTRAVENOUS ONCE AS NEEDED
Status: DISCONTINUED | OUTPATIENT
Start: 2021-12-29 | End: 2021-12-31 | Stop reason: HOSPADM

## 2021-12-29 RX ORDER — HYDROMORPHONE HCL 110MG/55ML
0.5 PATIENT CONTROLLED ANALGESIA SYRINGE INTRAVENOUS
Status: ACTIVE | OUTPATIENT
Start: 2021-12-29 | End: 2021-12-30

## 2021-12-29 RX ORDER — PROMETHAZINE HYDROCHLORIDE 25 MG/1
25 TABLET ORAL EVERY 6 HOURS PRN
Status: DISCONTINUED | OUTPATIENT
Start: 2021-12-29 | End: 2021-12-31 | Stop reason: HOSPADM

## 2021-12-29 RX ORDER — DOCUSATE SODIUM 100 MG/1
100 CAPSULE, LIQUID FILLED ORAL 2 TIMES DAILY PRN
Status: DISCONTINUED | OUTPATIENT
Start: 2021-12-29 | End: 2021-12-31 | Stop reason: HOSPADM

## 2021-12-29 RX ORDER — DIPHENHYDRAMINE HYDROCHLORIDE 50 MG/ML
25 INJECTION INTRAMUSCULAR; INTRAVENOUS EVERY 4 HOURS PRN
Status: DISCONTINUED | OUTPATIENT
Start: 2021-12-29 | End: 2021-12-31 | Stop reason: HOSPADM

## 2021-12-29 RX ORDER — FAMOTIDINE 10 MG/ML
INJECTION, SOLUTION INTRAVENOUS AS NEEDED
Status: DISCONTINUED | OUTPATIENT
Start: 2021-12-29 | End: 2021-12-29 | Stop reason: SURG

## 2021-12-29 RX ORDER — MISOPROSTOL 200 UG/1
800 TABLET ORAL AS NEEDED
Status: DISCONTINUED | OUTPATIENT
Start: 2021-12-29 | End: 2021-12-31 | Stop reason: HOSPADM

## 2021-12-29 RX ORDER — BUPIVACAINE HYDROCHLORIDE 7.5 MG/ML
INJECTION, SOLUTION EPIDURAL; RETROBULBAR
Status: COMPLETED | OUTPATIENT
Start: 2021-12-29 | End: 2021-12-29

## 2021-12-29 RX ORDER — LIDOCAINE HYDROCHLORIDE 10 MG/ML
5 INJECTION, SOLUTION EPIDURAL; INFILTRATION; INTRACAUDAL; PERINEURAL AS NEEDED
Status: DISCONTINUED | OUTPATIENT
Start: 2021-12-29 | End: 2021-12-29

## 2021-12-29 RX ORDER — PRENATAL VIT/IRON FUM/FOLIC AC 27MG-0.8MG
1 TABLET ORAL DAILY
Status: DISCONTINUED | OUTPATIENT
Start: 2021-12-29 | End: 2021-12-31 | Stop reason: HOSPADM

## 2021-12-29 RX ORDER — SIMETHICONE 80 MG
80 TABLET,CHEWABLE ORAL 4 TIMES DAILY PRN
Status: DISCONTINUED | OUTPATIENT
Start: 2021-12-29 | End: 2021-12-31 | Stop reason: HOSPADM

## 2021-12-29 RX ORDER — FENTANYL CITRATE 50 UG/ML
INJECTION, SOLUTION INTRAMUSCULAR; INTRAVENOUS AS NEEDED
Status: DISCONTINUED | OUTPATIENT
Start: 2021-12-29 | End: 2021-12-29 | Stop reason: SURG

## 2021-12-29 RX ORDER — ONDANSETRON 2 MG/ML
INJECTION INTRAMUSCULAR; INTRAVENOUS AS NEEDED
Status: DISCONTINUED | OUTPATIENT
Start: 2021-12-29 | End: 2021-12-29 | Stop reason: SURG

## 2021-12-29 RX ORDER — SODIUM CHLORIDE, SODIUM LACTATE, POTASSIUM CHLORIDE, CALCIUM CHLORIDE 600; 310; 30; 20 MG/100ML; MG/100ML; MG/100ML; MG/100ML
125 INJECTION, SOLUTION INTRAVENOUS CONTINUOUS
Status: DISCONTINUED | OUTPATIENT
Start: 2021-12-29 | End: 2021-12-31 | Stop reason: HOSPADM

## 2021-12-29 RX ORDER — SODIUM CHLORIDE 0.9 % (FLUSH) 0.9 %
1-10 SYRINGE (ML) INJECTION AS NEEDED
Status: DISCONTINUED | OUTPATIENT
Start: 2021-12-29 | End: 2021-12-29

## 2021-12-29 RX ORDER — CEFAZOLIN SODIUM 2 G/50ML
2 SOLUTION INTRAVENOUS ONCE
Status: COMPLETED | OUTPATIENT
Start: 2021-12-29 | End: 2021-12-29

## 2021-12-29 RX ORDER — OXYCODONE HYDROCHLORIDE AND ACETAMINOPHEN 5; 325 MG/1; MG/1
2 TABLET ORAL EVERY 4 HOURS PRN
Status: DISCONTINUED | OUTPATIENT
Start: 2021-12-30 | End: 2021-12-31 | Stop reason: HOSPADM

## 2021-12-29 RX ORDER — OXYCODONE HYDROCHLORIDE AND ACETAMINOPHEN 5; 325 MG/1; MG/1
1 TABLET ORAL EVERY 4 HOURS PRN
Status: ACTIVE | OUTPATIENT
Start: 2021-12-29 | End: 2021-12-30

## 2021-12-29 RX ORDER — CARBOPROST TROMETHAMINE 250 UG/ML
250 INJECTION, SOLUTION INTRAMUSCULAR AS NEEDED
Status: DISCONTINUED | OUTPATIENT
Start: 2021-12-29 | End: 2021-12-31 | Stop reason: HOSPADM

## 2021-12-29 RX ORDER — SODIUM CHLORIDE 0.9 % (FLUSH) 0.9 %
10 SYRINGE (ML) INJECTION EVERY 12 HOURS SCHEDULED
Status: DISCONTINUED | OUTPATIENT
Start: 2021-12-29 | End: 2021-12-29

## 2021-12-29 RX ORDER — ONDANSETRON 4 MG/1
4 TABLET, FILM COATED ORAL EVERY 6 HOURS PRN
Status: DISCONTINUED | OUTPATIENT
Start: 2021-12-29 | End: 2021-12-31 | Stop reason: HOSPADM

## 2021-12-29 RX ORDER — NICOTINE 21 MG/24HR
1 PATCH, TRANSDERMAL 24 HOURS TRANSDERMAL EVERY 24 HOURS
Status: DISCONTINUED | OUTPATIENT
Start: 2021-12-29 | End: 2021-12-31 | Stop reason: HOSPADM

## 2021-12-29 RX ORDER — IBUPROFEN 800 MG/1
800 TABLET ORAL EVERY 8 HOURS PRN
Status: DISCONTINUED | OUTPATIENT
Start: 2021-12-30 | End: 2021-12-31 | Stop reason: HOSPADM

## 2021-12-29 RX ORDER — KETOROLAC TROMETHAMINE 30 MG/ML
30 INJECTION, SOLUTION INTRAMUSCULAR; INTRAVENOUS EVERY 6 HOURS
Status: COMPLETED | OUTPATIENT
Start: 2021-12-29 | End: 2021-12-30

## 2021-12-29 RX ORDER — FAMOTIDINE 10 MG/ML
INJECTION, SOLUTION INTRAVENOUS
Status: DISPENSED
Start: 2021-12-29 | End: 2021-12-29

## 2021-12-29 RX ORDER — SCOLOPAMINE TRANSDERMAL SYSTEM 1 MG/1
PATCH, EXTENDED RELEASE TRANSDERMAL AS NEEDED
Status: DISCONTINUED | OUTPATIENT
Start: 2021-12-29 | End: 2021-12-29 | Stop reason: SURG

## 2021-12-29 RX ADMIN — SODIUM CHLORIDE, POTASSIUM CHLORIDE, SODIUM LACTATE AND CALCIUM CHLORIDE 1000 ML: 600; 310; 30; 20 INJECTION, SOLUTION INTRAVENOUS at 08:50

## 2021-12-29 RX ADMIN — ONDANSETRON 4 MG: 2 INJECTION INTRAMUSCULAR; INTRAVENOUS at 10:13

## 2021-12-29 RX ADMIN — PRENATAL VIT W/ FE FUMARATE-FA TAB 27-0.8 MG 1 TABLET: 27-0.8 TAB at 12:45

## 2021-12-29 RX ADMIN — FENTANYL CITRATE 20 MCG: 50 INJECTION INTRAMUSCULAR; INTRAVENOUS at 10:30

## 2021-12-29 RX ADMIN — SCOPALAMINE 1 PATCH: 1 PATCH, EXTENDED RELEASE TRANSDERMAL at 10:13

## 2021-12-29 RX ADMIN — Medication 1 PATCH: at 12:45

## 2021-12-29 RX ADMIN — OXYCODONE HYDROCHLORIDE AND ACETAMINOPHEN 1 TABLET: 10; 325 TABLET ORAL at 15:00

## 2021-12-29 RX ADMIN — KETOROLAC TROMETHAMINE 30 MG: 30 INJECTION, SOLUTION INTRAMUSCULAR; INTRAVENOUS at 11:05

## 2021-12-29 RX ADMIN — PHENYLEPHRINE HYDROCHLORIDE 100 MCG: 10 INJECTION INTRAVENOUS at 11:05

## 2021-12-29 RX ADMIN — SODIUM CHLORIDE, POTASSIUM CHLORIDE, SODIUM LACTATE AND CALCIUM CHLORIDE 125 ML/HR: 600; 310; 30; 20 INJECTION, SOLUTION INTRAVENOUS at 11:45

## 2021-12-29 RX ADMIN — KETOROLAC TROMETHAMINE 30 MG: 30 INJECTION, SOLUTION INTRAMUSCULAR; INTRAVENOUS at 17:45

## 2021-12-29 RX ADMIN — PHENYLEPHRINE HYDROCHLORIDE 100 MCG: 10 INJECTION INTRAVENOUS at 11:02

## 2021-12-29 RX ADMIN — OXYTOCIN-SODIUM CHLORIDE 0.9% IV SOLN 30 UNIT/500ML 85 ML/HR: 30-0.9/5 SOLUTION at 15:05

## 2021-12-29 RX ADMIN — OXYCODONE HYDROCHLORIDE AND ACETAMINOPHEN 1 TABLET: 10; 325 TABLET ORAL at 21:09

## 2021-12-29 RX ADMIN — MORPHINE SULFATE 150 MCG: 1 INJECTION, SOLUTION EPIDURAL; INTRATHECAL; INTRAVENOUS at 10:30

## 2021-12-29 RX ADMIN — OXYTOCIN-SODIUM CHLORIDE 0.9% IV SOLN 30 UNIT/500ML 500 ML/HR: 30-0.9/5 SOLUTION at 10:59

## 2021-12-29 RX ADMIN — PHENYLEPHRINE HYDROCHLORIDE 100 MCG: 10 INJECTION INTRAVENOUS at 11:22

## 2021-12-29 RX ADMIN — OXYTOCIN-SODIUM CHLORIDE 0.9% IV SOLN 30 UNIT/500ML 650 ML/HR: 30-0.9/5 SOLUTION at 10:59

## 2021-12-29 RX ADMIN — SODIUM CHLORIDE, POTASSIUM CHLORIDE, SODIUM LACTATE AND CALCIUM CHLORIDE: 600; 310; 30; 20 INJECTION, SOLUTION INTRAVENOUS at 10:26

## 2021-12-29 RX ADMIN — BUPIVACAINE HYDROCHLORIDE 1.6 ML: 7.5 INJECTION, SOLUTION EPIDURAL; RETROBULBAR at 10:30

## 2021-12-29 RX ADMIN — PHENYLEPHRINE HYDROCHLORIDE 100 MCG: 10 INJECTION INTRAVENOUS at 10:39

## 2021-12-29 RX ADMIN — Medication 85 ML/HR: at 15:05

## 2021-12-29 RX ADMIN — LIDOCAINE HYDROCHLORIDE 3 ML: 10 INJECTION, SOLUTION EPIDURAL; INFILTRATION; INTRACAUDAL; PERINEURAL at 10:29

## 2021-12-29 RX ADMIN — DIPHENHYDRAMINE HYDROCHLORIDE 12.5 MG: 50 INJECTION, SOLUTION INTRAMUSCULAR; INTRAVENOUS at 10:13

## 2021-12-29 RX ADMIN — CEFAZOLIN SODIUM 2 G: 2 SOLUTION INTRAVENOUS at 10:30

## 2021-12-29 RX ADMIN — FAMOTIDINE 20 MG: 10 INJECTION, SOLUTION INTRAVENOUS at 10:13

## 2021-12-29 RX ADMIN — SODIUM CHLORIDE, POTASSIUM CHLORIDE, SODIUM LACTATE AND CALCIUM CHLORIDE 125 ML/HR: 600; 310; 30; 20 INJECTION, SOLUTION INTRAVENOUS at 18:25

## 2021-12-29 NOTE — ANESTHESIA PROCEDURE NOTES
Spinal Block      Patient reassessed immediately prior to procedure    Start Time: 12/29/2021 10:28 AM  Stop Time: 12/29/2021 10:30 AM  Indication:at surgeon's request and post-op pain management  Performed By  CRNA: Bakari Romero CRNA  Preanesthetic Checklist  Completed: patient identified, IV checked, site marked, risks and benefits discussed, surgical consent, monitors and equipment checked, pre-op evaluation and timeout performed  Spinal Block Prep:  Patient Position:sitting  Sterile Tech:cap, gloves, mask and sterile barriers  Prep:Chloraprep  Patient Monitoring:blood pressure monitoring, continuous pulse oximetry and EKG  Spinal Block Procedure  Approach:midline  Guidance:landmark technique and palpation technique  Location:L3-L4  Needle Type:Sprotte  Needle Gauge:25 G  Placement of Spinal needle event:cerebrospinal fluid aspirated  Paresthesia: no  Fluid Appearance:clear  Medications: bupivacaine PF (MARCAINE) injection 0.75%, 1.6 mL  Med Administered at 12/29/2021 10:30 AM   Post Assessment  Patient Tolerance:patient tolerated the procedure well with no apparent complications  Complications no

## 2021-12-29 NOTE — ANESTHESIA POSTPROCEDURE EVALUATION
Patient: Syl Patel    Procedure Summary     Date: 21 Room / Location: [facility] LABOR DELIVERY 1 / [facility] LABOR DELIVERY    Anesthesia Start: 1026 Anesthesia Stop: 1146    Procedure:  SECTION REPEAT (N/A Abdomen) Diagnosis:       Previous  section      (Previous  section [Z98.891])    Surgeons: Taryn Viveros DO Provider: Bakari Romero CRNA    Anesthesia Type: ITN, spinal ASA Status: 2          Anesthesia Type: ITN, spinal    Vitals  Vitals Value Taken Time   /60 21 1151   Temp 97.7 °F (36.5 °C) 21 1145   Pulse 80 21 1151   Resp 18 21 1145   SpO2 99 % 21 1145           Post Anesthesia Care and Evaluation    Patient location during evaluation: bedside  Patient participation: complete - patient participated  Level of consciousness: awake and alert  Pain score: 0  Pain management: adequate  Airway patency: patent  Anesthetic complications: No anesthetic complications  PONV Status: none  Cardiovascular status: acceptable  Respiratory status: acceptable  Hydration status: acceptable  Post Neuraxial Block status: No signs or symptoms of PDPH

## 2021-12-29 NOTE — ANESTHESIA PREPROCEDURE EVALUATION
Anesthesia Evaluation     Patient summary reviewed and Nursing notes reviewed   no history of anesthetic complications:  NPO Solid Status: > 8 hours  NPO Liquid Status: > 8 hours           Airway   Mallampati: II  TM distance: >3 FB  Neck ROM: full  Possible difficult intubation  Dental      Pulmonary    (+) a smoker Current Smoked day of surgery, decreased breath sounds,   Cardiovascular - negative cardio ROS  Exercise tolerance: good (4-7 METS)    Rhythm: regular  Rate: normal        Neuro/Psych- negative ROS  GI/Hepatic/Renal/Endo    (+)  GERD poorly controlled,      Musculoskeletal     (+) back pain,   Abdominal   (+) obese,    Substance History - negative use     OB/GYN    (+) Pregnant,         Other - negative ROS                       Anesthesia Plan    ASA 2     ITN and spinal     intravenous induction     Anesthetic plan, all risks, benefits, and alternatives have been provided, discussed and informed consent has been obtained with: patient.  Use of blood products discussed with patient  Consented to blood products.

## 2021-12-30 LAB
HCT VFR BLD AUTO: 32.6 % (ref 34–46.6)
HGB BLD-MCNC: 10.4 G/DL (ref 12–15.9)

## 2021-12-30 PROCEDURE — 0503F POSTPARTUM CARE VISIT: CPT | Performed by: NURSE PRACTITIONER

## 2021-12-30 PROCEDURE — 85018 HEMOGLOBIN: CPT | Performed by: OBSTETRICS & GYNECOLOGY

## 2021-12-30 PROCEDURE — 85014 HEMATOCRIT: CPT | Performed by: OBSTETRICS & GYNECOLOGY

## 2021-12-30 PROCEDURE — 25010000002 KETOROLAC TROMETHAMINE PER 15 MG: Performed by: OBSTETRICS & GYNECOLOGY

## 2021-12-30 RX ORDER — FERROUS GLUCONATE 324(37.5)
324 TABLET ORAL 2 TIMES DAILY WITH MEALS
Status: DISCONTINUED | OUTPATIENT
Start: 2021-12-30 | End: 2021-12-31 | Stop reason: HOSPADM

## 2021-12-30 RX ADMIN — Medication 324 MG: at 11:20

## 2021-12-30 RX ADMIN — PRENATAL VIT W/ FE FUMARATE-FA TAB 27-0.8 MG 1 TABLET: 27-0.8 TAB at 08:08

## 2021-12-30 RX ADMIN — DOCUSATE SODIUM 100 MG: 100 CAPSULE, LIQUID FILLED ORAL at 17:26

## 2021-12-30 RX ADMIN — DOCUSATE SODIUM 100 MG: 100 CAPSULE, LIQUID FILLED ORAL at 11:21

## 2021-12-30 RX ADMIN — Medication 1 PATCH: at 13:25

## 2021-12-30 RX ADMIN — KETOROLAC TROMETHAMINE 30 MG: 30 INJECTION, SOLUTION INTRAMUSCULAR; INTRAVENOUS at 00:01

## 2021-12-30 RX ADMIN — OXYCODONE HYDROCHLORIDE AND ACETAMINOPHEN 2 TABLET: 5; 325 TABLET ORAL at 11:21

## 2021-12-30 RX ADMIN — OXYCODONE HYDROCHLORIDE AND ACETAMINOPHEN 2 TABLET: 5; 325 TABLET ORAL at 17:27

## 2021-12-30 RX ADMIN — OXYCODONE HYDROCHLORIDE AND ACETAMINOPHEN 1 TABLET: 10; 325 TABLET ORAL at 06:11

## 2021-12-30 RX ADMIN — KETOROLAC TROMETHAMINE 30 MG: 30 INJECTION, SOLUTION INTRAMUSCULAR; INTRAVENOUS at 06:06

## 2021-12-30 RX ADMIN — Medication 324 MG: at 17:27

## 2021-12-30 RX ADMIN — IBUPROFEN 800 MG: 800 TABLET ORAL at 11:54

## 2021-12-31 VITALS
WEIGHT: 255 LBS | BODY MASS INDEX: 40.98 KG/M2 | HEIGHT: 66 IN | RESPIRATION RATE: 16 BRPM | SYSTOLIC BLOOD PRESSURE: 115 MMHG | DIASTOLIC BLOOD PRESSURE: 53 MMHG | HEART RATE: 102 BPM | TEMPERATURE: 98.3 F | OXYGEN SATURATION: 96 %

## 2021-12-31 PROBLEM — Z34.90 PREGNANCY: Status: RESOLVED | Noted: 2020-09-28 | Resolved: 2021-12-31

## 2021-12-31 PROCEDURE — 0503F POSTPARTUM CARE VISIT: CPT | Performed by: OBSTETRICS & GYNECOLOGY

## 2021-12-31 RX ORDER — OXYCODONE HYDROCHLORIDE AND ACETAMINOPHEN 5; 325 MG/1; MG/1
TABLET ORAL
Qty: 20 TABLET | Refills: 0 | Status: SHIPPED | OUTPATIENT
Start: 2021-12-31 | End: 2022-11-30

## 2021-12-31 RX ORDER — PSEUDOEPHEDRINE HCL 30 MG
100 TABLET ORAL 2 TIMES DAILY PRN
Qty: 30 CAPSULE | Refills: 1 | Status: SHIPPED | OUTPATIENT
Start: 2021-12-31 | End: 2022-11-30

## 2021-12-31 RX ORDER — IBUPROFEN 800 MG/1
800 TABLET ORAL EVERY 8 HOURS PRN
Qty: 30 TABLET | Refills: 0 | Status: SHIPPED | OUTPATIENT
Start: 2021-12-31 | End: 2022-11-30

## 2021-12-31 RX ADMIN — OXYCODONE HYDROCHLORIDE AND ACETAMINOPHEN 2 TABLET: 5; 325 TABLET ORAL at 00:40

## 2021-12-31 RX ADMIN — IBUPROFEN 800 MG: 800 TABLET ORAL at 00:40

## 2021-12-31 RX ADMIN — PRENATAL VIT W/ FE FUMARATE-FA TAB 27-0.8 MG 1 TABLET: 27-0.8 TAB at 08:01

## 2021-12-31 RX ADMIN — Medication 324 MG: at 08:01

## 2022-01-14 ENCOUNTER — POSTPARTUM VISIT (OUTPATIENT)
Dept: OBSTETRICS AND GYNECOLOGY | Facility: CLINIC | Age: 27
End: 2022-01-14

## 2022-01-14 VITALS
WEIGHT: 233 LBS | DIASTOLIC BLOOD PRESSURE: 70 MMHG | BODY MASS INDEX: 37.45 KG/M2 | HEIGHT: 66 IN | SYSTOLIC BLOOD PRESSURE: 122 MMHG

## 2022-01-14 DIAGNOSIS — Z98.890 POSTOPERATIVE STATE: Primary | ICD-10-CM

## 2022-01-14 PROCEDURE — 0503F POSTPARTUM CARE VISIT: CPT | Performed by: OBSTETRICS & GYNECOLOGY

## 2022-01-14 NOTE — PROGRESS NOTES
"Postpartum Note    Chief Complaint: Postpartum Visit    HPI      Date of delivery: 21: s/p RLTCS    The patient feels well. Patient describes her bleeding as staining only.     Breastfeeding: declines. Denies any PP depression/anxiety.    Review of Systems   Constitutional: Negative for appetite change, chills, fatigue, fever and unexpected weight change.   Gastrointestinal: Negative for abdominal distention, abdominal pain, anal bleeding, blood in stool, constipation, diarrhea, nausea and vomiting.   Genitourinary: Positive for vaginal bleeding. Negative for dyspareunia, dysuria, menstrual problem, pelvic pain, vaginal discharge and vaginal pain.       The following portions of the patient's history were reviewed and updated as appropriate: allergies, current medications and problem list.             /70   Ht 167.6 cm (66\")   Wt 106 kg (233 lb)   LMP 2020 (Exact Date)   Breastfeeding No   BMI 37.61 kg/m²       Physical Exam  Vitals reviewed.   Constitutional:       General: She is not in acute distress.     Appearance: Normal appearance. She is normal weight. She is not ill-appearing, toxic-appearing or diaphoretic.   Abdominal:      General: There is no distension.      Palpations: Abdomen is soft.      Tenderness: There is no abdominal tenderness.      Comments: Incision C/D/I   Neurological:      General: No focal deficit present.      Mental Status: She is alert and oriented to person, place, and time. Mental status is at baseline.      Motor: No weakness.      Gait: Gait normal.   Psychiatric:         Mood and Affect: Mood normal.         Behavior: Behavior normal.         Thought Content: Thought content normal.         Judgment: Judgment normal.             27yo  s/p RLTCS          1) POD#21: Progressing well.     2) Postpartum Care: Progressing well. Bottle feeding female infant. Denies any PP depression    3) Gyn HM: LPS 2020. Will need pap smear at final PPV.    4) " Contraception: Planning on OCPs- will start at 6 week PPV.    5) FU 1 year/douglasn          Taryn Viveros DO  1/19/2022  13:35 EST

## 2022-11-30 ENCOUNTER — OFFICE VISIT (OUTPATIENT)
Dept: OBSTETRICS AND GYNECOLOGY | Facility: CLINIC | Age: 27
End: 2022-11-30

## 2022-11-30 VITALS
SYSTOLIC BLOOD PRESSURE: 120 MMHG | BODY MASS INDEX: 38.57 KG/M2 | HEIGHT: 66 IN | WEIGHT: 240 LBS | DIASTOLIC BLOOD PRESSURE: 76 MMHG

## 2022-11-30 DIAGNOSIS — N92.6 MISSED MENSES: Primary | ICD-10-CM

## 2022-11-30 LAB
B-HCG UR QL: POSITIVE
EXPIRATION DATE: ABNORMAL
INTERNAL NEGATIVE CONTROL: NEGATIVE
INTERNAL POSITIVE CONTROL: POSITIVE
Lab: 55

## 2022-11-30 PROCEDURE — 81025 URINE PREGNANCY TEST: CPT | Performed by: STUDENT IN AN ORGANIZED HEALTH CARE EDUCATION/TRAINING PROGRAM

## 2022-11-30 PROCEDURE — 90471 IMMUNIZATION ADMIN: CPT | Performed by: STUDENT IN AN ORGANIZED HEALTH CARE EDUCATION/TRAINING PROGRAM

## 2022-11-30 PROCEDURE — 99214 OFFICE O/P EST MOD 30 MIN: CPT | Performed by: STUDENT IN AN ORGANIZED HEALTH CARE EDUCATION/TRAINING PROGRAM

## 2022-11-30 PROCEDURE — 90686 IIV4 VACC NO PRSV 0.5 ML IM: CPT | Performed by: STUDENT IN AN ORGANIZED HEALTH CARE EDUCATION/TRAINING PROGRAM

## 2022-11-30 NOTE — PROGRESS NOTES
Confirmation of pregnancy     Chief Complaint   Patient presents with   • Amenorrhea     LMP- 22         Syl Patel is being seen today for evaluation of absence of menses. Due to her period being late, she tested for pregnancy and had + home UPT. She is a 27 y.o. . This problem is new to me, the examiner.       LNMP: [id number]; Has irregular cycles   Confident with date: No  Taking prenatal vitamins: Yes. Needs RX: No  Planned pregnancy: Yes  Prior obstetric issues, potential pregnancy concerns: 17wga 2nd trimester MAB with retained placenta. Hx of LTCS x3   Family history of genetic issues (includes FOB): ACADM carrier; father test neg   Varicella Hx: Immune   Flu vaccine: Today  COVID 19 vaccine: DECLINES  History of STDs: Denies   Current medications: PNV  Last pap smear: NILM   Smoker: Yes  Drug or alcohol abuse: No  H/O physical, emotional or sexual abuse:Denies  H/O mental health disorder: Denies   Prior testing for Cystic Fibrosis Carrier or Sickle Cell Trait- Denies   Prepregnancy BMI - Body mass index is 38.76 kg/m².    No past medical history on file.    Past Surgical History:   Procedure Laterality Date   •  SECTION     •  SECTION N/A 2021    Procedure:  SECTION REPEAT;  Surgeon: Taryn Viveros DO;  Location: [facility] LABOR DELIVERY;  Service: Obstetrics/Gynecology;  Laterality: N/A;   • D & C HYSTEROSCOPY N/A 10/28/2020    Procedure: SUCTION DILATATION AND CURETTAGE FOR RETAINED PLACENTA  AFTER DELIVERY;  Surgeon: Jose R Grey MD;  Location: [facility] OR;  Service: Obstetrics/Gynecology;  Laterality: N/A;         Current Outpatient Medications:   •  docusate sodium 100 MG capsule, Take 1 capsule by mouth 2 (Two) Times a Day As Needed for Constipation., Disp: 30 capsule, Rfl: 1  •  ibuprofen (ADVIL,MOTRIN) 800 MG tablet, Take 1 tablet by mouth Every 8 (Eight) Hours As Needed for Mild Pain., Disp: 30 tablet, Rfl: 0  •  nicotine (Nicoderm CQ)  "14 MG/24HR patch, Place 1 patch on the skin as directed by provider Daily., Disp: 30 patch, Rfl: 3  •  ondansetron (Zofran) 4 MG tablet, Take 1 tablet by mouth Every 8 (Eight) Hours As Needed for Nausea or Vomiting., Disp: 30 tablet, Rfl: 2  •  oxyCODONE-acetaminophen (PERCOCET) 5-325 MG per tablet, Take 1-2 tablets by mouth every 4-6 hours as needed for pain, Disp: 20 tablet, Rfl: 0  •  prenatal vitamin (prenatal, CLASSIC, vitamin) tablet, Take  by mouth Daily., Disp: , Rfl:     No Known Allergies    Social History     Socioeconomic History   • Marital status:    Tobacco Use   • Smoking status: Every Day     Packs/day: 1.00     Types: Cigarettes   • Smokeless tobacco: Never   • Tobacco comments:     1-800 quit now    Vaping Use   • Vaping Use: Never used   • Passive vaping exposure: Yes   Substance and Sexual Activity   • Alcohol use: Never   • Drug use: Never   • Sexual activity: Yes     Partners: Male       Family History   Problem Relation Age of Onset   • Stroke Father        Review of systems     Review of Systems     Constitutional: Weight change and appetite change present.   Respiratory: Negative for cough and shortness of breath.    Cardiovascular: Negative for chest pain and palpitations.   Gastrointestinal: Denies N&V    Endocrine: Negative.    Genitourinary: + missed menses, no dysuria   Skin: Negative.    Neurological: Negative for dizziness and headaches.   Hematological: Negative.    Psychiatric/Behavioral: Negative for dysphoric mood and sleep disturbance. The patient is not nervous/anxious.      Objective    /76   Ht 167.6 cm (65.98\")   Wt 109 kg (240 lb)   LMP 09/19/2022 (Exact Date)   Breastfeeding No   BMI 38.76 kg/m²     Physical Exam  Vitals: VSS; AF    General Appearance:  Awake. Alert. Well developed. Well nourished. In no acute distress.    Neurological:  ° Oriented to time, place, and person.  Skin:  ° General appearance was normal. No bruising or " ecchymosis.    Assessment/Plan      1. Missed menses: + UPT in office ( Also home pregnancy test positive) . LNMP [id number] = 10+2 week EGA with an EDC= [id number]. DUS tomorrow ( [facility] tech out today)  Oriented to practice.     2. Pregnancy: Disc importance of regular prenatal care. Enc PNV daily. Counseled on providers and on call phone. Disc Tylenol products are ok and encouraged no ibuprofen or ASA in pregnancy.  Disc exercise in pregnancy, diet, expected weight gain, etc. Enc no use of tobacco, vaping, drugs, or alcohol during pregnancy. Rev warn s/s of SAB.     3. Labs: Pt counseled on genetic screening, Quad screen, AFP, and NIPS.     Class 2 Severe Obesity (BMI >=35 and <=39.9). Obesity-related health conditions include the following: none. Obesity is newly identified. BMI is is above average; BMI management plan is completed. We discussed portion control and increasing exercise.  4.     5. Smoker- Yes. Declines cessation at this time     6.   COVID19 precautions were reviewed with the patient. Continue to encourage social distancing, wearing a mask, and good hand hygiene.  I wore a mask, protective eye wear, and gloves during this patient encounter.  Patient also wearing a surgical mask and social distancing was observed. Hand hygeine performed before and after seeing the patient. Also encouraged COVID booster vaccine at 6 month interval from last COVID vaccine. Info provided on Covid vaccine: Declines     7.  Flu vaccine. Encouraged the flu vaccine during pregnancy. Discuss normal physiological changes during pregnancy increase the susceptibility of the flu virus and increase the risk of severe illness for the pregnant woman. Disc flu can be harmful to the unborn baby as well. Enc the flu vaccine. Disc with patient that getting the flu vaccine is the first and most important step in protecting against the flu. Flu vaccines given during pregnancy help protect both the mother and the baby. Getting the flu vaccine  during pregnancy also helps protect the  from flu illness for several months after their birth, when then are too young to get vaccinated. Also disc the importance of good hand hygiene and avoiding people who are sick. The patient received flu vaccine.         All questions answered.     Counseling was given to patient for the following topics: instructions for management and patient and family education . Total time of the encounter was 30 minutes and 20 minutes was spend counseling.    Encounter Diagnoses   Name Primary?   • Missed menses Yes       Diagnoses and all orders for this visit:    Missed menses  -     POC Pregnancy, Urine    Other orders  -     FluLaval/Fluzone >6 mos ([id number])        RTO DUS tomorrow;  Need to schedule new OB exam, labs , genetic testing .     Dhruv Jackson, DO  2022  14:23 EST

## 2023-01-10 ENCOUNTER — INITIAL PRENATAL (OUTPATIENT)
Dept: OBSTETRICS AND GYNECOLOGY | Facility: CLINIC | Age: 28
End: 2023-01-10
Payer: MEDICAID

## 2023-01-10 VITALS — WEIGHT: 238.4 LBS | BODY MASS INDEX: 38.5 KG/M2 | SYSTOLIC BLOOD PRESSURE: 136 MMHG | DIASTOLIC BLOOD PRESSURE: 84 MMHG

## 2023-01-10 DIAGNOSIS — Z36.9 ENCOUNTER FOR ANTENATAL SCREENING, UNSPECIFIED: ICD-10-CM

## 2023-01-10 DIAGNOSIS — Z01.419 PAP SMEAR, LOW-RISK: ICD-10-CM

## 2023-01-10 DIAGNOSIS — O35.8XX0 CYSTIC HYGROMA OF FETUS IN SINGLETON PREGNANCY: ICD-10-CM

## 2023-01-10 DIAGNOSIS — Z98.891 PREVIOUS CESAREAN SECTION: Primary | ICD-10-CM

## 2023-01-10 PROBLEM — N92.6 MISSED MENSES: Status: RESOLVED | Noted: 2022-11-30 | Resolved: 2023-01-10

## 2023-01-10 LAB
EXTERNAL NIPT: NORMAL
GLUCOSE UR STRIP-MCNC: NEGATIVE MG/DL
PROT UR STRIP-MCNC: NEGATIVE MG/DL

## 2023-01-10 PROCEDURE — 99214 OFFICE O/P EST MOD 30 MIN: CPT | Performed by: OBSTETRICS & GYNECOLOGY

## 2023-01-10 NOTE — PROGRESS NOTES
Patient Care Team:  Provider, No Known as PCP - General    Chief complaint new OB physical       HPI: This is a 27-year-old  7 para 3-0-2-3 at 16-1/7 weeks here for new OB physical.  She reports no complaints.  No nausea or vomiting.  No vaginal bleeding.  She has a history of a previous  and a history of cystic hygroma with an IUFD and retained placenta during her one of her previous pregnancy.    ROS:   Constitutional: Negative for appetite change, fever and unexpected weight change.   Respiratory: Negative for cough and shortness of breath.    Cardiovascular: Negative for chest pain and palpitations.   Gastrointestinal: Negative for abdominal pain, constipation, diarrhea, nausea and vomiting.   Endocrine: Negative.    Genitourinary: Negative for dyspareunia, pelvic pain and vaginal discharge.   Skin: Negative.    Neurological: Negative for dizziness and headaches.   Hematological: Negative.    Psychiatric/Behavioral: Negative for dysphoric mood and sleep disturbance. The patient is not nervous/anxious.        PMH: No past medical history on file.      PSH:   Past Surgical History:   Procedure Laterality Date   •  SECTION     •  SECTION N/A 2021    Procedure:  SECTION REPEAT;  Surgeon: Taryn Viveros DO;  Location: [facility] LABOR DELIVERY;  Service: Obstetrics/Gynecology;  Laterality: N/A;   • D & C HYSTEROSCOPY N/A 10/28/2020    Procedure: SUCTION DILATATION AND CURETTAGE FOR RETAINED PLACENTA  AFTER DELIVERY;  Surgeon: Jose R Grey MD;  Location: [facility] OR;  Service: Obstetrics/Gynecology;  Laterality: N/A;       SoHx:   Social History     Socioeconomic History   • Marital status:    Tobacco Use   • Smoking status: Every Day     Packs/day: 1.00     Types: Cigarettes   • Smokeless tobacco: Never   • Tobacco comments:     1-800 quit now    Vaping Use   • Vaping Use: Never used   • Passive vaping exposure: Yes   Substance and Sexual Activity    • Alcohol use: Never   • Drug use: Never   • Sexual activity: Yes     Partners: Male       FHx:   Family History   Problem Relation Age of Onset   • Stroke Father        PGyn Hx: Deferred    POBHx: See HPI    Allergies: Patient has no known allergies.    Medications:   Current Outpatient Medications on File Prior to Visit   Medication Sig Dispense Refill   • prenatal vitamin (prenatal, CLASSIC, vitamin) tablet Take  by mouth Daily.       No current facility-administered medications on file prior to visit.             Vital Signs  BP: (136)/(84) 136/84    Physical Exam:  Constitutional: She is oriented to person, place, and time. She appears well-developed and well-nourished.   HENT:   Head: Normocephalic and atraumatic.   Cardiovascular: Normal rate and regular rhythm.    Pulmonary/Chest: Effort normal. No respiratory distress.   Abdominal: Soft. Bowel sounds are normal. There is no tenderness. There is no rebound and no guarding.   Musculoskeletal: Normal range of motion.   Neurological: She is alert and oriented to person, place, and time.   Skin: Skin is warm and dry.   Psychiatric: She has a normal mood and affect. Her behavior is normal. Judgment and thought content normal.   Nursing note and vitals reviewed.  Debilities/Disabilities Identified: None  Emotional Behavior: Appropriate    Labs: Prenatal labs ordered today.  Desires noninvasive prenatal testing for fetal gender.  Declines AFP.  Previous genetic work-up positive for MCAD but FOB negative.      Assessment/Plan: IUP at 16-1/7 weeks    Prenatal labs drawn today.  Ultrasound at 20 weeks for anatomy.  Follow-up Pap. I spent 20 minutes caring for Syl on this date of service. This time includes time spent by me in the following activities: preparing for the visit, reviewing tests, performing a medically appropriate examination and/or evaluation, counseling and educating the patient/family/caregiver, ordering medications, tests, or procedures and  documenting information in the medical record.      Liam Dang MD  01/10/23  14:09 EST

## 2023-01-11 LAB
ABO GROUP BLD: ABNORMAL
BASOPHILS # BLD AUTO: 0 X10E3/UL (ref 0–0.2)
BASOPHILS NFR BLD AUTO: 0 %
BLD GP AB SCN SERPL QL: NEGATIVE
EOSINOPHIL # BLD AUTO: 0.3 X10E3/UL (ref 0–0.4)
EOSINOPHIL NFR BLD AUTO: 2 %
ERYTHROCYTE [DISTWIDTH] IN BLOOD BY AUTOMATED COUNT: 12.5 % (ref 11.7–15.4)
HBA1C MFR BLD: 5.3 % (ref 4.8–5.6)
HBV SURFACE AG SERPL QL IA: NEGATIVE
HCT VFR BLD AUTO: 37.5 % (ref 34–46.6)
HCV AB S/CO SERPL IA: <0.1 S/CO RATIO (ref 0–0.9)
HGB BLD-MCNC: 13 G/DL (ref 11.1–15.9)
HIV 1+2 AB+HIV1 P24 AG SERPL QL IA: NON REACTIVE
IMM GRANULOCYTES # BLD AUTO: 0.1 X10E3/UL (ref 0–0.1)
IMM GRANULOCYTES NFR BLD AUTO: 1 %
LYMPHOCYTES # BLD AUTO: 3.1 X10E3/UL (ref 0.7–3.1)
LYMPHOCYTES NFR BLD AUTO: 21 %
MCH RBC QN AUTO: 31.7 PG (ref 26.6–33)
MCHC RBC AUTO-ENTMCNC: 34.7 G/DL (ref 31.5–35.7)
MCV RBC AUTO: 92 FL (ref 79–97)
MONOCYTES # BLD AUTO: 0.5 X10E3/UL (ref 0.1–0.9)
MONOCYTES NFR BLD AUTO: 3 %
NEUTROPHILS # BLD AUTO: 10.7 X10E3/UL (ref 1.4–7)
NEUTROPHILS NFR BLD AUTO: 73 %
PLATELET # BLD AUTO: 254 X10E3/UL (ref 150–450)
RBC # BLD AUTO: 4.1 X10E6/UL (ref 3.77–5.28)
RH BLD: POSITIVE
RPR SER QL: NON REACTIVE
RUBV IGG SERPL IA-ACNC: 2.86 INDEX
VZV IGG SER IA-ACNC: 645 INDEX
WBC # BLD AUTO: 14.6 X10E3/UL (ref 3.4–10.8)

## 2023-01-12 LAB
AMPHETAMINES UR QL SCN: NEGATIVE NG/ML
BACTERIA UR CULT: NORMAL
BACTERIA UR CULT: NORMAL
BARBITURATES UR QL SCN: NEGATIVE NG/ML
BENZODIAZ UR QL SCN: NEGATIVE NG/ML
BZE UR QL SCN: NEGATIVE NG/ML
C TRACH RRNA CVX QL NAA+PROBE: NEGATIVE
CANNABINOIDS UR QL SCN: NEGATIVE NG/ML
CONV .: NORMAL
CREAT UR-MCNC: 58.3 MG/DL (ref 20–300)
CYTOLOGIST CVX/VAG CYTO: NORMAL
CYTOLOGY CVX/VAG DOC CYTO: NORMAL
CYTOLOGY CVX/VAG DOC THIN PREP: NORMAL
DX ICD CODE: NORMAL
HIV 1 & 2 AB SER-IMP: NORMAL
LABORATORY COMMENT REPORT: NORMAL
METHADONE UR QL SCN: NEGATIVE NG/ML
N GONORRHOEA RRNA CVX QL NAA+PROBE: NEGATIVE
OPIATES UR QL SCN: NEGATIVE NG/ML
OTHER STN SPEC: NORMAL
OXYCODONE+OXYMORPHONE UR QL SCN: NEGATIVE NG/ML
PCP UR QL: NEGATIVE NG/ML
PH UR: 6.1 [PH] (ref 4.5–8.9)
PROPOXYPH UR QL SCN: NEGATIVE NG/ML
STAT OF ADQ CVX/VAG CYTO-IMP: NORMAL
T VAGINALIS RRNA SPEC QL NAA+PROBE: NEGATIVE

## 2023-01-14 LAB
CFDNA.FET/CFDNA.TOTAL SFR FETUS: NORMAL %
CITATION REF LAB TEST: NORMAL
FET 13+18+21+X+Y ANEUP PLAS.CFDNA: NEGATIVE
FET CHR 21 TS PLAS.CFDNA QL: NEGATIVE
FET SEX PLAS.CFDNA DOSAGE CFDNA: NORMAL
FET TS 13 RISK PLAS.CFDNA QL: NEGATIVE
FET TS 18 RISK WBC.DNA+CFDNA QL: NEGATIVE
GA EST FROM CONCEPTION DATE: NORMAL D
GESTATIONAL AGE > 9:: YES
LAB DIRECTOR NAME PROVIDER: NORMAL
LAB DIRECTOR NAME PROVIDER: NORMAL
LABORATORY COMMENT REPORT: NORMAL
LIMITATIONS OF THE TEST: NORMAL
NEGATIVE PREDICTIVE VALUE: NORMAL
NOTE: NORMAL
PERFORMANCE CHARACTERISTICS: NORMAL
POSITIVE PREDICTIVE VALUE: NORMAL
REF LAB TEST METHOD: NORMAL
TEST PERFORMANCE INFO SPEC: NORMAL

## 2023-02-07 ENCOUNTER — ROUTINE PRENATAL (OUTPATIENT)
Dept: OBSTETRICS AND GYNECOLOGY | Facility: CLINIC | Age: 28
End: 2023-02-07
Payer: MEDICAID

## 2023-02-07 VITALS — WEIGHT: 238 LBS | SYSTOLIC BLOOD PRESSURE: 130 MMHG | BODY MASS INDEX: 38.43 KG/M2 | DIASTOLIC BLOOD PRESSURE: 74 MMHG

## 2023-02-07 DIAGNOSIS — O34.211 MATERNAL CARE DUE TO LOW TRANSVERSE UTERINE SCAR FROM PREVIOUS CESAREAN DELIVERY: ICD-10-CM

## 2023-02-07 DIAGNOSIS — F17.200 SMOKER: ICD-10-CM

## 2023-02-07 DIAGNOSIS — E71.311 MCAD (MEDIUM-CHAIN ACYL-COA DEHYDROGENASE DEFICIENCY): Primary | ICD-10-CM

## 2023-02-07 DIAGNOSIS — O44.00 PLACENTA PREVIA ANTEPARTUM: ICD-10-CM

## 2023-02-07 DIAGNOSIS — O35.8XX0 CYSTIC HYGROMA OF FETUS IN SINGLETON PREGNANCY: ICD-10-CM

## 2023-02-07 PROBLEM — O26.849 FUNDAL HEIGHT LOW FOR DATES: Status: RESOLVED | Noted: 2021-12-28 | Resolved: 2023-02-07

## 2023-02-07 PROBLEM — Z98.891 PREVIOUS CESAREAN SECTION: Status: RESOLVED | Noted: 2020-08-19 | Resolved: 2023-02-07

## 2023-02-07 LAB
GLUCOSE UR STRIP-MCNC: NEGATIVE MG/DL
PROT UR STRIP-MCNC: NEGATIVE MG/DL

## 2023-02-07 PROCEDURE — 99213 OFFICE O/P EST LOW 20 MIN: CPT | Performed by: STUDENT IN AN ORGANIZED HEALTH CARE EDUCATION/TRAINING PROGRAM

## 2023-02-07 RX ORDER — NICOTINE 21 MG/24HR
1 PATCH, TRANSDERMAL 24 HOURS TRANSDERMAL EVERY 24 HOURS
Qty: 42 PATCH | Refills: 0 | Status: SHIPPED | OUTPATIENT
Start: 2023-02-07 | End: 2023-03-30 | Stop reason: SDUPTHER

## 2023-02-07 NOTE — PROGRESS NOTES
Ob follow up      Syl Patel is a 27 y.o.  20w1d patient being seen today for her obstetrical visit. Patient reports no complaints. No bleeding.  Fetal movement: normal.      ROS - Denies leaking fluid, vaginal bleeding and notes good fetal movement.     /74   Wt 108 kg (238 lb)   LMP 2022 (Exact Date)   BMI 38.43 kg/m²     Vitals: VSS; AF    General Appearance:  Awake. Alert. Well developed. Well nourished. In no acute distress.    Visual Inspection: ° Abdomen was normal on visual inspection.  Palpation: ° Abdomen was soft. ° Abdominal non-tender.    Uterus: ° Fundal height was normal for gestational age. ° Not tender.  Uterine Adnexae: ° Normal without masses or tenderness.  Neurological:  ° Oriented to time, place, and person.  Skin:  ° General appearance was normal. No bruising or ecchymosis.  Obstetrical: +FM and FCA     A/P      1) 28yo  @ 20+1 ; Anatomy scan as below     Presentation: VTX  Placenta: Posterior ( Unsure if partial vs Complete previa)   MVP 3.97cm  FCA: 150's  Cervical length:  4.13  EFW  Consistent with dates   Fetal Anatomy WNL     MFM consult ( Unlikely accreta with posterior placenta and previa but want to assess placentation)      2) NIPT Neg   AFP declined      3) Neg CF, Fragile X, SMN1      4) MCAD +   Father of baby Negative      5) Partial vs Complete previa  MFM Level II US  Pelvic rest      6) Smoking   Patient was counseled for 3-10 minutes regarding effects of smoking in pregnancy, including risk of poor growth,  birth and stillbirth as well as an increase in asthma and ear infections in infancy.  Patient did voice a willingness to quit and information was given for review. She is currently smoking 1 ppd of cigarettes. She has made a goal to decrease to <1/2 ppd  By 28 wga . She did accept medication to help her quit.      7) Flu vaccine received      8)Reviewed this stage of pregnancy     9)Problem list updated      10) RTO 4 weeks OBT, MFM  consult     I spent 30 minutes caring for Syl on this date of service. This time includes time spent by me in the following activities: preparing for the visit, reviewing tests, obtaining and/or reviewing a separately obtained history, performing a medically appropriate examination and/or evaluation, counseling and educating the patient/family/caregiver, ordering medications, tests, or procedures, documenting information in the medical record, independently interpreting results and communicating that information with the patient/family/caregiver and care coordination       Dhruv Jackson, DO     2/7/2023  12:42 EST

## 2023-03-14 NOTE — PROGRESS NOTES
MATERNAL FETAL MEDICINE Consult Note    Dear Dr Dhruv Jackson DO:    Thank you for your kind referral of Syl Patel.  As you know, she is a 27 y.o.   at  25 3/7 weeks gestation (Estimated Date of Delivery: 23). This is a consult.      Her antepartum course is complicated by:  Placenta previa not seen today  Circumvallate placenta  Tobacco abuse    Aneuploidy Screening: low risk    HPI: Today, she denies headache, blurry vision, RUQ pain. No vaginal bleeding, no contractions.     Review of History:  History reviewed. No pertinent past medical history.  Past Surgical History:   Procedure Laterality Date   •  SECTION     •  SECTION N/A 2021    Procedure:  SECTION REPEAT;  Surgeon: Taryn Viveros DO;  Location: [facility] LABOR DELIVERY;  Service: Obstetrics/Gynecology;  Laterality: N/A;   •  SECTION     • D & C HYSTEROSCOPY N/A 10/28/2020    Procedure: SUCTION DILATATION AND CURETTAGE FOR RETAINED PLACENTA  AFTER DELIVERY;  Surgeon: Jose R Grey MD;  Location: [facility] OR;  Service: Obstetrics/Gynecology;  Laterality: N/A;       Social History     Socioeconomic History   • Marital status:    Tobacco Use   • Smoking status: Every Day     Packs/day: 0.50     Types: Cigarettes   • Smokeless tobacco: Never   • Tobacco comments:     1-800 quit now    Vaping Use   • Vaping Use: Never used   • Passive vaping exposure: Yes   Substance and Sexual Activity   • Alcohol use: Never   • Drug use: Never   • Sexual activity: Yes     Partners: Male     Birth control/protection: None     Family History   Problem Relation Age of Onset   • Stroke Father       No Known Allergies   Current Outpatient Medications on File Prior to Visit   Medication Sig Dispense Refill   • nicotine (Nicoderm CQ) 21 MG/24HR patch Place 1 patch on the skin as directed by provider Daily. 1 patch every day for 6 weeks. 42 patch 0   • prenatal vitamin (prenatal, CLASSIC, vitamin)  "tablet Take  by mouth Daily.       No current facility-administered medications on file prior to visit.        Past obstetric, gynecological, medical, surgical, family and social history reviewed.  Relevant lab work and imaging reviewed.    Review of systems  Constitutional:  denies fever, chills, malaise.   ENT/Mouth:  denies sore throat, tinnitis  Eyes: denies vision changes/pain  CV:  denies chest pain  Respiratory:  denies cough/SOB  GI:  denies N/V, diarrhea, abdominal pain.    :   denies dysuria  Skin:  denies lesions or pruritis   Neuro:  denies weakness, focal neurologic symptoms    Vitals:    23 1100   BP: 119/63   BP Location: Right arm   Patient Position: Sitting   Pulse: 108   Resp: 16   Temp: 98.6 °F (37 °C)   TempSrc: Temporal   Weight: 109 kg (241 lb)   Height: 170.2 cm (67\")       PHYSICAL EXAM   GENERAL: Not in acute distress, AAOx3, pleasant  CARDIO: regular rate and rhythm  PULM: symmetric chest rise, speaking in complete sentences without difficulty  NEURO: awake, alert and oriented to person, place, and time  ABDOMINAL: No fundal tenderness, no rebound or guarding, gravid  EXTREMITIES: no bilateral lower extremity edema/tenderness  SKIN: Warm, well-perfused      ULTRASOUND   Please view full ultrasound note on Imaging tab in [employer].  Breech presentation.  Posterior circumvallate placenta, no previa (internal os 6.5 cm from placenta).  No evidence of accreta  VINCENZO 16 cm, which is normal.    g (56% AC 89%)  Normal appearing anatomy visualized.  Cervical length 4.6 cm, which is normal.     ASSESSMENT/COUNSELIN y.o.   at  25 3/7 weeks gestation (Estimated Date of Delivery: 23).    -Pregnancy  [ X ] stable  [   ] improving [  ] worsening    Diagnoses and all orders for this visit:    1. Antepartum placenta circumvallata (Primary)    2. Smoker          Circumvallate placenta:  No previa--pt congratulated.  No evidence of accreta  A circumvallate placenta is one " where the placental edge is somewhat lifted off the wall of the uterus. It occurs in approximately 1% of pregnancies. We discussed increased risk of FGR due to decreased placental area.  There is a theoretical increased risk of abruption.  In most recent studies, this has not panned out and it is thought that these risks are likely minimally or not increased.  I do recommend serial growth exams out of an abundance of caution.      Tobacco use/ habituation.  Tobacco use in pregnancy is one of the most important modifiable risk factors that can result in catastrophic pregnancy complications. We discussed these risks, including but not limited to repeat abruption, placental abruption, fetal growth disorders,  birth I encouraged smoking cessation. If not possible, at least cutting down quantity of smoking has been linked to decreased risks of some adverse pregnancy outcomes. I encouraged her to reach out for assistance to either me or her OB provider to help with resources.  Right now she is doing well with cutting down little by little and using a patch.  She is down to half a pack a day.      Summary of Plan  -Serial growth ultrasounds every 4 wks (by primary OB)   -Continue attempts at tobacco cessation    Follow-up: No follow up with MFM scheduled, but I am happy to see for follow up at request of primary obstetrician    Thank you for the consult and opportunity to care for this patient.  Please feel free to reach out with any questions or concerns.      I spent 15 minutes caring for this patient on this date of service. This time includes time spent by me in the following activities: preparing for the visit, reviewing tests, obtaining and/or reviewing a separately obtained history, performing a medically appropriate examination and/or evaluation, counseling and educating the patient/family/caregiver and independently interpreting results and communicating that information with the patient/family/caregiver  with greater than 50% spent in counseling and coordination of care.       I spent 5 minutes on the separately reported service of US imaging not included in the time used to support the E/M service also reported today.      Makenna Lemos MD [facility]  Maternal Fetal Medicine-[facility]  Office: [phone]  [email or web address]

## 2023-03-16 ENCOUNTER — OFFICE VISIT (OUTPATIENT)
Dept: OBSTETRICS AND GYNECOLOGY | Facility: CLINIC | Age: 28
End: 2023-03-16
Payer: MEDICAID

## 2023-03-16 ENCOUNTER — HOSPITAL ENCOUNTER (OUTPATIENT)
Dept: ULTRASOUND IMAGING | Facility: HOSPITAL | Age: 28
Discharge: HOME | End: 2023-03-16
Admitting: STUDENT IN AN ORGANIZED HEALTH CARE EDUCATION/TRAINING PROGRAM
Payer: MEDICAID

## 2023-03-16 VITALS
BODY MASS INDEX: 37.83 KG/M2 | RESPIRATION RATE: 16 BRPM | SYSTOLIC BLOOD PRESSURE: 119 MMHG | WEIGHT: 241 LBS | DIASTOLIC BLOOD PRESSURE: 63 MMHG | HEART RATE: 108 BPM | HEIGHT: 67 IN | TEMPERATURE: 98.6 F

## 2023-03-16 DIAGNOSIS — F17.200 SMOKER: ICD-10-CM

## 2023-03-16 DIAGNOSIS — O34.211 MATERNAL CARE DUE TO LOW TRANSVERSE UTERINE SCAR FROM PREVIOUS CESAREAN DELIVERY: ICD-10-CM

## 2023-03-16 DIAGNOSIS — O43.119 ANTEPARTUM PLACENTA CIRCUMVALLATA: Primary | ICD-10-CM

## 2023-03-16 DIAGNOSIS — O44.00 PLACENTA PREVIA ANTEPARTUM: ICD-10-CM

## 2023-03-16 PROCEDURE — 76817 TRANSVAGINAL US OBSTETRIC: CPT

## 2023-03-16 PROCEDURE — 76811 OB US DETAILED SNGL FETUS: CPT

## 2023-03-16 PROCEDURE — 99213 OFFICE O/P EST LOW 20 MIN: CPT | Performed by: OBSTETRICS & GYNECOLOGY

## 2023-03-16 PROCEDURE — 76817 TRANSVAGINAL US OBSTETRIC: CPT | Performed by: OBSTETRICS & GYNECOLOGY

## 2023-03-16 PROCEDURE — 76811 OB US DETAILED SNGL FETUS: CPT | Performed by: OBSTETRICS & GYNECOLOGY

## 2023-03-16 NOTE — PROGRESS NOTES
Pt reports that she is doing well and denies vaginal bleeding, cramping, contractions or LOF at this time. Reports active fetal movement. Reviewed when to call OB office or present to L&D for evaluation with symptoms such as decreased fetal movement, vaginal bleeding, LOF or ctxs. Pt verbalized understanding. Denies HA, visual changes or epigastric pain. Denies any additional complaints at time of appointment. Next OB appointment scheduled for 03/23    Vitals:    03/16/23 1100   BP: 119/63   Pulse: 108   Resp: 16   Temp: 98.6 °F (37 °C)

## 2023-03-22 ENCOUNTER — REFERRAL TRIAGE (OUTPATIENT)
Dept: LABOR AND DELIVERY | Facility: HOSPITAL | Age: 28
End: 2023-03-22
Payer: MEDICAID

## 2023-03-27 ENCOUNTER — PATIENT OUTREACH (OUTPATIENT)
Dept: LABOR AND DELIVERY | Facility: HOSPITAL | Age: 28
End: 2023-03-27
Payer: MEDICAID

## 2023-03-27 NOTE — OUTREACH NOTE
[facility]  Unable to Reach       Questions/Answers    Flowsheet Row Responses   Pending Outreach Confirm Patient Interest   Call Attempt First   Outcome Left message   Next Call Attempt Date 03/28/23          Left vm, sent moont msg prev. Left vm encouraging her to reach out with any needs.  Will attempt interest call again tomorrow.    Guanako Jain RN  Maternity Nurse Navigator    3/27/2023, 16:51 EDT

## 2023-03-28 ENCOUNTER — PATIENT OUTREACH (OUTPATIENT)
Dept: LABOR AND DELIVERY | Facility: HOSPITAL | Age: 28
End: 2023-03-28
Payer: MEDICAID

## 2023-03-28 NOTE — OUTREACH NOTE
[facility]  Unable to Reach       Questions/Answers    Flowsheet Row Responses   Pending Outreach Confirm Patient Interest   Call Attempt Second   Outcome Left message, MyChart message sent to patient          Declined program for pt d/t UTR: 2 voicemails and 1 mychart msg sent. Pt has my contact info and encouraged her to reach out with any needs.      Guanako Jain RN  Maternity Nurse Navigator    3/28/2023, 12:40 EDT

## 2023-03-30 ENCOUNTER — ROUTINE PRENATAL (OUTPATIENT)
Dept: OBSTETRICS AND GYNECOLOGY | Facility: CLINIC | Age: 28
End: 2023-03-30
Payer: MEDICAID

## 2023-03-30 VITALS — WEIGHT: 243.4 LBS | SYSTOLIC BLOOD PRESSURE: 126 MMHG | DIASTOLIC BLOOD PRESSURE: 74 MMHG | BODY MASS INDEX: 38.12 KG/M2

## 2023-03-30 DIAGNOSIS — E04.9 ENLARGED THYROID: ICD-10-CM

## 2023-03-30 DIAGNOSIS — O34.211 MATERNAL CARE DUE TO LOW TRANSVERSE UTERINE SCAR FROM PREVIOUS CESAREAN DELIVERY: ICD-10-CM

## 2023-03-30 DIAGNOSIS — E71.311 MCAD (MEDIUM-CHAIN ACYL-COA DEHYDROGENASE DEFICIENCY): Primary | ICD-10-CM

## 2023-03-30 DIAGNOSIS — O35.8XX0 CYSTIC HYGROMA OF FETUS IN SINGLETON PREGNANCY: ICD-10-CM

## 2023-03-30 DIAGNOSIS — F17.200 SMOKER: ICD-10-CM

## 2023-03-30 DIAGNOSIS — Z30.2 ENCOUNTER FOR STERILIZATION: ICD-10-CM

## 2023-03-30 PROBLEM — O43.119 ANTEPARTUM PLACENTA CIRCUMVALLATA: Status: ACTIVE | Noted: 2023-03-30

## 2023-03-30 RX ORDER — NICOTINE 21 MG/24HR
1 PATCH, TRANSDERMAL 24 HOURS TRANSDERMAL EVERY 24 HOURS
Qty: 42 PATCH | Refills: 0 | Status: SHIPPED | OUTPATIENT
Start: 2023-03-30

## 2023-03-30 NOTE — PROGRESS NOTES
Ob follow up      Syl Patel is a 27 y.o.  27+3 patient being seen today for her obstetrical visit. Patient reports no complaints. No bleeding.  Fetal movement: normal. Has seen MFM; Ate this am unable to do 2hr gtt, Does desire Tdap ( We are out at clinic )       ROS - Denies leaking fluid, vaginal bleeding and notes good fetal movement.     /74   Wt 110 kg (243 lb 6.4 oz)   LMP 2022 (Exact Date)   BMI 38.12 kg/m²     Vitals: VSS; AF    General Appearance:  Awake. Alert. Well developed. Well nourished. In no acute distress.    Visual Inspection: ° Abdomen was normal on visual inspection.  Palpation: ° Abdomen was soft. ° Abdominal non-tender.    Uterus: ° Fundal height was normal for gestational age. ° Not tender.  Uterine Adnexae: ° Normal without masses or tenderness.  Neurological:  ° Oriented to time, place, and person.  Skin:  ° General appearance was normal. No bruising or ecchymosis.  Obstetrical: +FM and FCA     A/P      1) 28yo  @ 27+3   2hr and H/H tomorrow ( did not fast this am )      2) NIPT Neg   AFP declined      3) Neg CF, Fragile X, SMN1      4) MCAD +   Father of baby Negative      5) Circumvallate placenta   MFM Level II US; Growth ~ 25wga [id number]   g (56% AC 89%)    -Serial growth ultrasounds every 4 wks (by primary OB)   30 wga ( )   34 wga ( )   38 wga ( )          6) Smoking   Patient was counseled for 3-10 minutes regarding effects of smoking in pregnancy, including risk of poor growth,  birth and stillbirth as well as an increase in asthma and ear infections in infancy.  Patient did voice a willingness to quit and information was given for review. She is currently smoking 1 ppd of cigarettes. She has made a goal to decrease to <1/2 ppd  By 28 wga . She did accept medication to help her quit.   Refill Patch today      7) Flu vaccine received ; Desires Tdap next appt     8) Sterilization   Sterilization Consult  We discussed all other forms of  contraception including pills, patch, vaginal ring, injection, implant, IUD, and vasectomy. We discussed the forms of permanent sterilization including  laparoscopic tubal occlusion/partial salpingectomy, and laparoscopic salpingectomy.  We discussed a minimal increased risk of bleeding with the salpingectomy as well as the benefits including ovarian cancer risk reduction and reduced risk of ectopic pregnancy.   We discussed a risk of regret of up to 40% in women <30 years of age.  I asked her to consider if her desires would change if something happened to her current children, if she were to divorce, or if her spouse were to die unexpectedly.  We discussed that this is a permanent procedure and that she would need in vitro fertilization at the cost of up to $20,000 per cycle if she desired a pregnancy after this procedure.    We discussed that risks of surgery also include bleeding, possible need for a transfusion (with associated risks of HIV, hepatitis, and other infectious diseases), infection requiring prolonged hospitalization and treatment with antibiotics, damage to other structures (bowel, bladder, ureters, blood vessels) requiring further surgery necessitating a larger incision to remove or repair affected organs with subsequent poor wound healing, and persistent pain.    She was also counseled that anesthesia carries its own risks and that any major surgery carries the rare risk of blood clots, pulmonary embolism, stroke, heart attack, or death. All of the patient's questions were answered to her satisfaction and she desires to proceed with surgery.     9)Reviewed this stage of pregnancy     10)Problem list updated      10) RTO tomorrow labs ( 2hr and H/H; possible Tdap if available) , 3 weeks for OB, Growth     I spent 30 minutes caring for Syl on this date of service. This time includes time spent by me in the following activities: preparing for the visit, reviewing tests, obtaining and/or  reviewing a separately obtained history, performing a medically appropriate examination and/or evaluation, counseling and educating the patient/family/caregiver, ordering medications, tests, or procedures, documenting information in the medical record, independently interpreting results and communicating that information with the patient/family/caregiver and care coordination       Dhruv Jackson DO     3/30/2023  11:07 EDT

## 2023-03-31 ENCOUNTER — LAB (OUTPATIENT)
Dept: OBSTETRICS AND GYNECOLOGY | Facility: CLINIC | Age: 28
End: 2023-03-31
Payer: MEDICAID

## 2023-03-31 DIAGNOSIS — Z36.9 ENCOUNTER FOR ANTENATAL SCREENING, UNSPECIFIED: Primary | ICD-10-CM

## 2023-03-31 LAB
GLUCOSE 1H P 75 G GLC PO SERPL-MCNC: 118 MG/DL (ref 65–179)
GLUCOSE 2H P 75 G GLC PO SERPL-MCNC: 130 MG/DL (ref 65–154)
GLUCOSE P FAST SERPL-MCNC: 82 MG/DL (ref 65–94)
HCT VFR BLD AUTO: 35.3 % (ref 34–46.6)
HGB BLD-MCNC: 11.8 G/DL (ref 12–15.9)

## 2023-04-20 ENCOUNTER — ROUTINE PRENATAL (OUTPATIENT)
Dept: OBSTETRICS AND GYNECOLOGY | Facility: CLINIC | Age: 28
End: 2023-04-20
Payer: MEDICAID

## 2023-04-20 VITALS — DIASTOLIC BLOOD PRESSURE: 76 MMHG | BODY MASS INDEX: 37.9 KG/M2 | WEIGHT: 242 LBS | SYSTOLIC BLOOD PRESSURE: 122 MMHG

## 2023-04-20 DIAGNOSIS — O35.8XX0 CYSTIC HYGROMA OF FETUS IN SINGLETON PREGNANCY: ICD-10-CM

## 2023-04-20 DIAGNOSIS — Z23 NEED FOR TDAP VACCINATION: ICD-10-CM

## 2023-04-20 DIAGNOSIS — F17.200 SMOKER: ICD-10-CM

## 2023-04-20 DIAGNOSIS — E71.311 MCAD (MEDIUM-CHAIN ACYL-COA DEHYDROGENASE DEFICIENCY): Primary | ICD-10-CM

## 2023-04-20 DIAGNOSIS — D50.8 OTHER IRON DEFICIENCY ANEMIA: ICD-10-CM

## 2023-04-20 DIAGNOSIS — O34.211 MATERNAL CARE DUE TO LOW TRANSVERSE UTERINE SCAR FROM PREVIOUS CESAREAN DELIVERY: ICD-10-CM

## 2023-04-20 DIAGNOSIS — Z30.2 REQUEST FOR STERILIZATION: ICD-10-CM

## 2023-04-20 DIAGNOSIS — O43.119 ANTEPARTUM PLACENTA CIRCUMVALLATA: ICD-10-CM

## 2023-04-20 LAB
GLUCOSE UR STRIP-MCNC: NEGATIVE MG/DL
PROT UR STRIP-MCNC: NEGATIVE MG/DL

## 2023-04-20 RX ORDER — DOXYCYCLINE HYCLATE 50 MG/1
324 CAPSULE, GELATIN COATED ORAL
Qty: 30 TABLET | Refills: 6 | Status: SHIPPED | OUTPATIENT
Start: 2023-04-20

## 2023-04-20 NOTE — PROGRESS NOTES
OB follow up     Syl Patel is a 27 y.o.  30w3d being seen today for her obstetrical visit.  Patient reports no complaints. Fetal movement: normal. She is smoking 1/2 PPD. She is agreeable to her Tdap vaccine. She has anemia and we discussed starting an iron supplement.  This is the first time I am seeing this patient in this pregnancy and all of her problems are new to me, the examiner.       Review of Systems  No bleeding, No cramping/contractions     /76   Wt 110 kg (242 lb)   LMP 2022 (Exact Date)   BMI 37.90 kg/m²     FHT: 168 BPM   Uterine Size: 32  cm       Assessment/Plan:    1) 27 y.o.  -pregnancy at 30w3d- normal 2 hour GTT. RH+    2) Anemia- HgB 11.8- start daily iron    3) Patient advised to have the Tdap shot in the later part of pregnancy to help protect against whooping cough.  Also advised that FOB and other adults who come in contact with the infant should also be vaccinated with Tdap. Vaccine given today     4) Circumvallate placenta- placenta is posterior/circumvallate on US today.    5) S>D-  US growth today shows growth 59% (3.12#), VINCENZO= 17 cm. . Posteror/circumvallate placenta noted. US compared to her last scan onh 3/16/2023 at [facility]. Repeatt growth in 4 weeks    6)+ carrier MCAD- FOB negative.     7) H/O IUFD- fetus had cystic hygroma and T21. NIPT this pregnancy low risk, pt declined AFP    8) Patient was counseled for 3 minutes regarding effects of smoking in pregnancy, including risk of poor growth,  birth and stillbirth as well as an increase in asthma and ear infections in infancy.  Patient did not voice a willingness to quit and information was given for review.     9) Desires BTO- signed papers 3/30/23    10) Previous C/S x3- plan RLTCS and BTO at 39 weeks. Last op note reviewed and no mention of thin AMY    11) Reviewed this stage of pregnancy    12) Problem list updated     13) RTO 2 weeks OBT and 4 weeks OBT and US growth  (circumvallate placenta)    14)  I spent >  30minutes on the separately reported service of E& M.  This time includes time spent by me in the following activities: preparing for the visit, reviewing tests, obtaining and/or reviewing a separately obtained history, performing a medically appropriate examination and/or evaluation, counseling and educating the patient/family/caregiver, ordering medications, tests, or procedures, referring and communicating with other health care professionals, documenting information in the medical record, independently interpreting results and communicating that information with the patient/family/caregiver and care coordination. This time is not included in the time used to interpret the ultrasound also reported today.        Saritha Richardson MD    4/20/2023  08:53 EDT

## 2023-04-23 PROBLEM — D64.9 ABSOLUTE ANEMIA: Status: ACTIVE | Noted: 2023-04-23

## 2023-04-23 PROBLEM — Z23 NEED FOR TDAP VACCINATION: Status: ACTIVE | Noted: 2023-04-23

## 2023-05-19 ENCOUNTER — ROUTINE PRENATAL (OUTPATIENT)
Dept: OBSTETRICS AND GYNECOLOGY | Facility: CLINIC | Age: 28
End: 2023-05-19
Payer: MEDICAID

## 2023-05-19 VITALS — SYSTOLIC BLOOD PRESSURE: 124 MMHG | DIASTOLIC BLOOD PRESSURE: 78 MMHG | WEIGHT: 242 LBS | BODY MASS INDEX: 37.9 KG/M2

## 2023-05-19 DIAGNOSIS — F17.200 SMOKER: ICD-10-CM

## 2023-05-19 DIAGNOSIS — O34.211 MATERNAL CARE DUE TO LOW TRANSVERSE UTERINE SCAR FROM PREVIOUS CESAREAN DELIVERY: ICD-10-CM

## 2023-05-19 DIAGNOSIS — O43.119 ANTEPARTUM PLACENTA CIRCUMVALLATA: ICD-10-CM

## 2023-05-19 DIAGNOSIS — Z34.93 NORMAL PREGNANCY IN THIRD TRIMESTER: Primary | ICD-10-CM

## 2023-05-19 LAB
GLUCOSE UR STRIP-MCNC: NEGATIVE MG/DL
PROT UR STRIP-MCNC: NEGATIVE MG/DL

## 2023-05-19 NOTE — PROGRESS NOTES
OB follow up     Syl Patel is a 27 y.o.  34w4d being seen today for her obstetrical visit.  Patient reports no bleeding, no contractions and no leaking. Fetal movement: normal.  Prenatal care complicated by smoking and circumvallate placenta.  She is here for a 4-week growth scan.    Review of Systems  No bleeding, No cramping/contractions     /78   Wt 110 kg (242 lb)   LMP 2022 (Exact Date)   BMI 37.90 kg/m²     FHT: present BPM   Uterine Size: 34 cm   Live viable perez fetus in the vertex position.  Growth is in the 41st percentile.  VINCENZO is normal at 19 cm.  Grade 3 posterior fundal placenta.  Vertex position.    Assessment/Plan:    1) 27 y.o.  -pregnancy at 34w4d    2)   Encounter Diagnoses   Name Primary?   • Normal pregnancy in third trimester Yes   • Antepartum placenta circumvallata    • Smoker    • LTCS x3     Plan repeat low-transverse  section with tubal ligation at 39 weeks.  GBS collection next visit.  Growth is normal despite smoking and circumvallate placenta.    3) Reviewed this stage of pregnancy  4) Problem list updated     Return in about 2 weeks (around 2023) for OB INT, GBS.    I spent 20 minutes caring for Syl on this date of service. This time includes time spent by me in the following activities: preparing for the visit, reviewing tests, obtaining and/or reviewing a separately obtained history, performing a medically appropriate examination and/or evaluation, counseling and educating the patient/family/caregiver, ordering medications, tests, or procedures, documenting information in the medical record and independently interpreting results and communicating that information with the patient/family/caregiver      Liam Dang MD    2023  10:49 EDT

## 2023-06-02 ENCOUNTER — TELEPHONE (OUTPATIENT)
Dept: OBSTETRICS AND GYNECOLOGY | Facility: CLINIC | Age: 28
End: 2023-06-02

## 2023-06-02 NOTE — TELEPHONE ENCOUNTER
PATIENT WOULD LIKE TO KNOW IF SHE CAN SCHEDULE HER  OVER THE PHONE.     PLEASE ADVISE -[phone] AT ANYTIME, FINE WITH LVM IF UNABLE TO ANSWER.

## 2023-06-06 NOTE — TELEPHONE ENCOUNTER
PT RETURNING CALL IN REGARDS TO ABOVE COMM. SHE IS CALLING TO SEE IF SHE CAN SET THAT UP, SHE STATES HER HUSBANDS EMPLOYER IS TRYING TO GET DATES. HUB SPOKE WITH  AND ADVISED PT SHE WOULD NEED TO DISCUSS WITH PROVIDER AT [employer], PT AWARE, HOWEVER WOULD LIKE TO ADD ON TO TE.    OK TO CALLBACK ANYTIME, OK TO LEAVE A VM

## 2023-06-09 ENCOUNTER — ROUTINE PRENATAL (OUTPATIENT)
Dept: OBSTETRICS AND GYNECOLOGY | Facility: CLINIC | Age: 28
End: 2023-06-09
Payer: MEDICAID

## 2023-06-09 VITALS — BODY MASS INDEX: 38.84 KG/M2 | WEIGHT: 248 LBS | SYSTOLIC BLOOD PRESSURE: 112 MMHG | DIASTOLIC BLOOD PRESSURE: 70 MMHG

## 2023-06-09 DIAGNOSIS — Z36.85 ENCOUNTER FOR ANTENATAL SCREENING FOR STREPTOCOCCUS B: Primary | ICD-10-CM

## 2023-06-09 DIAGNOSIS — Z36.9 ENCOUNTER FOR ANTENATAL SCREENING, UNSPECIFIED: ICD-10-CM

## 2023-06-09 LAB
ERYTHROCYTE [DISTWIDTH] IN BLOOD BY AUTOMATED COUNT: 12.8 % (ref 12.3–15.4)
GLUCOSE UR STRIP-MCNC: NEGATIVE MG/DL
HCT VFR BLD AUTO: 36.5 % (ref 34–46.6)
HGB BLD-MCNC: 12.5 G/DL (ref 12–15.9)
MCH RBC QN AUTO: 31.3 PG (ref 26.6–33)
MCHC RBC AUTO-ENTMCNC: 34.2 G/DL (ref 31.5–35.7)
MCV RBC AUTO: 91.5 FL (ref 79–97)
PLATELET # BLD AUTO: 237 10*3/MM3 (ref 140–450)
PROT UR STRIP-MCNC: NEGATIVE MG/DL
RBC # BLD AUTO: 3.99 10*6/MM3 (ref 3.77–5.28)
WBC # BLD AUTO: 14.97 10*3/MM3 (ref 3.4–10.8)

## 2023-06-11 ENCOUNTER — PREP FOR SURGERY (OUTPATIENT)
Dept: OTHER | Facility: HOSPITAL | Age: 28
End: 2023-06-11
Payer: MEDICAID

## 2023-06-11 DIAGNOSIS — Z98.891 PREVIOUS CESAREAN SECTION: Primary | ICD-10-CM

## 2023-06-11 DIAGNOSIS — Z30.2 REQUEST FOR STERILIZATION: ICD-10-CM

## 2023-06-11 LAB — GP B STREP DNA SPEC QL NAA+PROBE: POSITIVE

## 2023-06-11 RX ORDER — OXYTOCIN/0.9 % SODIUM CHLORIDE 30/500 ML
999 PLASTIC BAG, INJECTION (ML) INTRAVENOUS ONCE
OUTPATIENT
Start: 2023-06-11 | End: 2023-06-11

## 2023-06-11 RX ORDER — SODIUM CHLORIDE, SODIUM LACTATE, POTASSIUM CHLORIDE, CALCIUM CHLORIDE 600; 310; 30; 20 MG/100ML; MG/100ML; MG/100ML; MG/100ML
125 INJECTION, SOLUTION INTRAVENOUS CONTINUOUS
OUTPATIENT
Start: 2023-06-11

## 2023-06-11 RX ORDER — BUPIVACAINE HCL/0.9 % NACL/PF 0.1 %
2 PLASTIC BAG, INJECTION (ML) EPIDURAL ONCE
OUTPATIENT
Start: 2023-06-11 | End: 2023-06-11

## 2023-06-11 RX ORDER — LIDOCAINE HYDROCHLORIDE 10 MG/ML
5 INJECTION, SOLUTION EPIDURAL; INFILTRATION; INTRACAUDAL; PERINEURAL AS NEEDED
OUTPATIENT
Start: 2023-06-11

## 2023-06-11 RX ORDER — SODIUM CHLORIDE 0.9 % (FLUSH) 0.9 %
10 SYRINGE (ML) INJECTION AS NEEDED
OUTPATIENT
Start: 2023-06-11

## 2023-06-11 RX ORDER — CARBOPROST TROMETHAMINE 250 UG/ML
250 INJECTION, SOLUTION INTRAMUSCULAR
OUTPATIENT
Start: 2023-06-11

## 2023-06-11 RX ORDER — OXYTOCIN/0.9 % SODIUM CHLORIDE 30/500 ML
250 PLASTIC BAG, INJECTION (ML) INTRAVENOUS CONTINUOUS
OUTPATIENT
Start: 2023-06-11 | End: 2023-06-11

## 2023-06-11 RX ORDER — SODIUM CHLORIDE 0.9 % (FLUSH) 0.9 %
10 SYRINGE (ML) INJECTION EVERY 12 HOURS SCHEDULED
OUTPATIENT
Start: 2023-06-11

## 2023-06-11 RX ORDER — METHYLERGONOVINE MALEATE 0.2 MG/ML
200 INJECTION INTRAVENOUS ONCE AS NEEDED
OUTPATIENT
Start: 2023-06-11

## 2023-06-11 RX ORDER — ACETAMINOPHEN 500 MG
1000 TABLET ORAL ONCE
OUTPATIENT
Start: 2023-06-11 | End: 2023-06-11

## 2023-06-11 RX ORDER — MISOPROSTOL 200 UG/1
800 TABLET ORAL ONCE AS NEEDED
OUTPATIENT
Start: 2023-06-11

## 2023-06-11 RX ORDER — KETOROLAC TROMETHAMINE 30 MG/ML
30 INJECTION, SOLUTION INTRAMUSCULAR; INTRAVENOUS ONCE
OUTPATIENT
Start: 2023-06-11 | End: 2023-06-11

## 2023-06-11 NOTE — PROGRESS NOTES
OB follow up     Syl Patel is a 27 y.o.  37w4d being seen today for her obstetrical visit.  Patient reports no complaints. Fetal movement: normal. She is on iron daily. She is still planning on getting her tubes tied.     Review of Systems  No bleeding, No cramping/contractions     /70   Wt 112 kg (248 lb)   LMP 2022 (Exact Date)   BMI 38.84 kg/m²     FHT: 155 BPM   Uterine Size: 38  cm     Cervix: 20 % effaced, FT, - 3  Assessment/Plan:    1) 27 y.o.  -pregnancy at 37w4d- GBS collected    2) Anemia- HgB 11.8- on daily iron. Check CBC.      3) S/P Tdap vaccine.      4) Circumvallate placenta- placenta is posterior/circumvallate > growth was 44% on 2023. Check US growth next week.      5) S>D-  US growth on 2023 was 44%. Check US growth next week.      6)+ carrier MCAD- FOB negative.      7) H/O IUFD- fetus had cystic hygroma and T21. NIPT this pregnancy low risk, pt declined AFP     8) Patient was counseled for 3 minutes regarding effects of smoking in pregnancy, including risk of poor growth,  birth and stillbirth as well as an increase in asthma and ear infections in infancy.  Patient did not voice a willingness to quit and information was given for review.      9) Desires BTO- signed papers 3/30/23     10) Previous C/S x3- plan RLTCS and BTO at 39 weeks. Last op note reviewed and no mention of thin AMY. Schedule C/S and BTO for 2023.      11) Reviewed this stage of pregnancy     12) Problem list updated      13) RTO  1weeks OBint  and US growth (circumvallate placenta and S>D)      Saritha Richardson MD    2023  12:03 EDT

## 2023-06-13 PROBLEM — Z98.891 PREVIOUS CESAREAN SECTION: Status: ACTIVE | Noted: 2023-05-19

## 2023-06-13 NOTE — PROGRESS NOTES
PIP= GBS positive. Pt is delivering by C/S and will receive ABX preop. Her HgB is normal. Continue iron.

## 2023-06-15 ENCOUNTER — ROUTINE PRENATAL (OUTPATIENT)
Dept: OBSTETRICS AND GYNECOLOGY | Facility: CLINIC | Age: 28
End: 2023-06-15
Payer: MEDICAID

## 2023-06-15 VITALS — DIASTOLIC BLOOD PRESSURE: 84 MMHG | SYSTOLIC BLOOD PRESSURE: 130 MMHG | BODY MASS INDEX: 39.81 KG/M2 | WEIGHT: 254.2 LBS

## 2023-06-15 DIAGNOSIS — O34.211 MATERNAL CARE DUE TO LOW TRANSVERSE UTERINE SCAR FROM PREVIOUS CESAREAN DELIVERY: ICD-10-CM

## 2023-06-15 DIAGNOSIS — E71.311 MCAD (MEDIUM-CHAIN ACYL-COA DEHYDROGENASE DEFICIENCY): ICD-10-CM

## 2023-06-15 DIAGNOSIS — O41.8X10 SUBCHORIONIC HEMATOMA IN FIRST TRIMESTER, SINGLE OR UNSPECIFIED FETUS: ICD-10-CM
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
## 2023-06-15 DIAGNOSIS — Z98.891 PREVIOUS CESAREAN SECTION: ICD-10-CM

## 2023-06-15 DIAGNOSIS — D50.8 OTHER IRON DEFICIENCY ANEMIA: ICD-10-CM

## 2023-06-15 DIAGNOSIS — Z30.2 REQUEST FOR STERILIZATION: ICD-10-CM

## 2023-06-15 DIAGNOSIS — O43.119 ANTEPARTUM PLACENTA CIRCUMVALLATA: ICD-10-CM

## 2023-06-15 DIAGNOSIS — Z36.9 ENCOUNTER FOR ANTENATAL SCREENING, UNSPECIFIED: Primary | ICD-10-CM

## 2023-06-15 DIAGNOSIS — O35.8XX0 CYSTIC HYGROMA OF FETUS IN SINGLETON PREGNANCY: ICD-10-CM

## 2023-06-15 DIAGNOSIS — O46.8X1 SUBCHORIONIC HEMATOMA IN FIRST TRIMESTER, SINGLE OR UNSPECIFIED FETUS: ICD-10-CM

## 2023-06-15 LAB
BILIRUB BLD-MCNC: NEGATIVE MG/DL
CLARITY, POC: CLEAR
COLOR UR: YELLOW
GLUCOSE UR STRIP-MCNC: NEGATIVE MG/DL
KETONES UR QL: NEGATIVE
LEUKOCYTE EST, POC: NEGATIVE
NITRITE UR-MCNC: NEGATIVE MG/ML
PH UR: 5 [PH] (ref 5–8)
PROT UR STRIP-MCNC: NEGATIVE MG/DL
RBC # UR STRIP: NEGATIVE /UL
SP GR UR: 1 (ref 1–1.03)
UROBILINOGEN UR QL: NORMAL

## 2023-06-15 NOTE — PROGRESS NOTES
OB follow up     Syl Patel is a 27 y.o.  38+3 being seen today for her obstetrical visit.  Patient reports no complaints. Fetal movement: normal. She is on iron daily.  Does have questions about visitor policy on L&D/PP     Review of Systems  No bleeding, No cramping/contractions     /84   Wt 115 kg (254 lb 3.2 oz)   LMP 2022 (Exact Date)   BMI 39.81 kg/m²     Vitals: VSS; AF    General Appearance:  Awake. Alert. Well developed. Well nourished. In no acute distress.    Visual Inspection: ° Abdomen was normal on visual inspection.  Palpation: ° Abdomen was soft. ° Abdominal non-tender.    Uterus: ° Fundal height was normal for gestational age. ° Not tender.  Uterine Adnexae: ° Normal without masses or tenderness.  Neurological:  ° Oriented to time, place, and person.  Skin:  ° General appearance was normal. No bruising or ecchymosis.  Obstetrical: +FM and FCA    Assessment/Plan:    1) 27 y.o.  -pregnancy at 38+3  GBS+      2) Anemia- HgB 11.8- on daily iron.   12.5 9Jun23     3) S/P Tdap vaccine.      4) Circumvallate placenta- placenta is posterior/circumvallate > growth was 44% on 2023.   Growth today ( )      5) S>D-  US growth on 2023 was 44%. Check US growth next week.      6)+ carrier MCAD- FOB negative.      7) H/O IUFD- fetus had cystic hygroma and T21. NIPT this pregnancy low risk, pt declined AFP     8) Patient was counseled for 3 minutes regarding effects of smoking in pregnancy, including risk of poor growth,  birth and stillbirth as well as an increase in asthma and ear infections in infancy.  Patient did not voice a willingness to quit and information was given for review.      9) Desires BTO- signed papers 3/30/23     10) Previous C/S x3- plan RLTCS and BTO at 39 weeks. Last op note reviewed and no mention of thin AMY. Schedule C/S and BTO for 2023.      11) Reviewed this stage of pregnancy     12) Problem list updated        I spent 30  minutes caring for Syl on this date of service. This time includes time spent by me in the following activities: preparing for the visit, reviewing tests, obtaining and/or reviewing a separately obtained history, performing a medically appropriate examination and/or evaluation, counseling and educating the patient/family/caregiver, ordering medications, tests, or procedures, documenting information in the medical record, independently interpreting results and communicating that information with the patient/family/caregiver and care coordination     Dhruv Jackson, DO    6/15/2023  08:52 EDT

## 2023-06-16 ENCOUNTER — ANESTHESIA EVENT (OUTPATIENT)
Dept: OBSTETRICS AND GYNECOLOGY | Facility: HOSPITAL | Age: 28
End: 2023-06-16
Payer: MEDICAID

## 2023-06-19 ENCOUNTER — ANESTHESIA (OUTPATIENT)
Dept: OBSTETRICS AND GYNECOLOGY | Facility: HOSPITAL | Age: 28
End: 2023-06-19
Payer: MEDICAID

## 2023-06-19 PROCEDURE — 25010000002 MORPHINE PER 10 MG: Performed by: NURSE ANESTHETIST, CERTIFIED REGISTERED

## 2023-06-19 PROCEDURE — 25010000002 PHENYLEPHRINE 10 MG/ML SOLUTION: Performed by: NURSE ANESTHETIST, CERTIFIED REGISTERED

## 2023-06-19 PROCEDURE — 25010000002 FENTANYL CITRATE (PF) 50 MCG/ML SOLUTION: Performed by: NURSE ANESTHETIST, CERTIFIED REGISTERED

## 2023-06-19 PROCEDURE — 25010000002 ONDANSETRON PER 1 MG: Performed by: NURSE ANESTHETIST, CERTIFIED REGISTERED

## 2023-06-19 PROCEDURE — 25010000002 AZITHROMYCIN PER 500 MG: Performed by: OBSTETRICS & GYNECOLOGY

## 2023-06-19 RX ORDER — SCOLOPAMINE TRANSDERMAL SYSTEM 1 MG/1
PATCH, EXTENDED RELEASE TRANSDERMAL AS NEEDED
Status: DISCONTINUED | OUTPATIENT
Start: 2023-06-19 | End: 2023-06-19 | Stop reason: SURG

## 2023-06-19 RX ORDER — FAMOTIDINE 10 MG/ML
INJECTION, SOLUTION INTRAVENOUS AS NEEDED
Status: DISCONTINUED | OUTPATIENT
Start: 2023-06-19 | End: 2023-06-19 | Stop reason: SURG

## 2023-06-19 RX ORDER — ONDANSETRON 2 MG/ML
INJECTION INTRAMUSCULAR; INTRAVENOUS AS NEEDED
Status: DISCONTINUED | OUTPATIENT
Start: 2023-06-19 | End: 2023-06-19 | Stop reason: SURG

## 2023-06-19 RX ORDER — MORPHINE SULFATE 1 MG/ML
INJECTION, SOLUTION EPIDURAL; INTRATHECAL; INTRAVENOUS AS NEEDED
Status: DISCONTINUED | OUTPATIENT
Start: 2023-06-19 | End: 2023-06-19 | Stop reason: SURG

## 2023-06-19 RX ORDER — FENTANYL CITRATE 50 UG/ML
INJECTION, SOLUTION INTRAMUSCULAR; INTRAVENOUS AS NEEDED
Status: DISCONTINUED | OUTPATIENT
Start: 2023-06-19 | End: 2023-06-19 | Stop reason: SURG

## 2023-06-19 RX ORDER — PHENYLEPHRINE HYDROCHLORIDE 10 MG/ML
INJECTION INTRAVENOUS AS NEEDED
Status: DISCONTINUED | OUTPATIENT
Start: 2023-06-19 | End: 2023-06-19 | Stop reason: SURG

## 2023-06-19 RX ORDER — BUPIVACAINE HYDROCHLORIDE 5 MG/ML
INJECTION, SOLUTION PERINEURAL
Status: COMPLETED | OUTPATIENT
Start: 2023-06-19 | End: 2023-06-19

## 2023-06-19 RX ADMIN — OXYTOCIN-SODIUM CHLORIDE 0.9% IV SOLN 30 UNIT/500ML 30 UNITS: 30-0.9/5 SOLUTION at 11:20

## 2023-06-19 RX ADMIN — FENTANYL CITRATE 20 MCG: 50 INJECTION, SOLUTION INTRAMUSCULAR; INTRAVENOUS at 11:02

## 2023-06-19 RX ADMIN — PHENYLEPHRINE HYDROCHLORIDE 100 MCG: 10 INJECTION INTRAVENOUS at 11:32

## 2023-06-19 RX ADMIN — FAMOTIDINE 20 MG: 10 INJECTION, SOLUTION INTRAVENOUS at 10:44

## 2023-06-19 RX ADMIN — PHENYLEPHRINE HYDROCHLORIDE 200 MCG: 10 INJECTION INTRAVENOUS at 11:41

## 2023-06-19 RX ADMIN — PHENYLEPHRINE HYDROCHLORIDE 200 MCG: 10 INJECTION INTRAVENOUS at 11:38

## 2023-06-19 RX ADMIN — PHENYLEPHRINE HYDROCHLORIDE 200 MCG: 10 INJECTION INTRAVENOUS at 11:43

## 2023-06-19 RX ADMIN — MORPHINE SULFATE 150 MCG: 1 INJECTION EPIDURAL; INTRATHECAL; INTRAVENOUS at 11:02

## 2023-06-19 RX ADMIN — PHENYLEPHRINE HYDROCHLORIDE 100 MCG: 10 INJECTION INTRAVENOUS at 11:35

## 2023-06-19 RX ADMIN — PHENYLEPHRINE HYDROCHLORIDE 100 MCG: 10 INJECTION INTRAVENOUS at 11:09

## 2023-06-19 RX ADMIN — PHENYLEPHRINE HYDROCHLORIDE 100 MCG: 10 INJECTION INTRAVENOUS at 11:14

## 2023-06-19 RX ADMIN — PHENYLEPHRINE HYDROCHLORIDE 200 MCG: 10 INJECTION INTRAVENOUS at 11:11

## 2023-06-19 RX ADMIN — SODIUM CHLORIDE 500 MG: 900 INJECTION, SOLUTION INTRAVENOUS at 11:08

## 2023-06-19 RX ADMIN — PHENYLEPHRINE HYDROCHLORIDE 100 MCG: 10 INJECTION INTRAVENOUS at 11:20

## 2023-06-19 RX ADMIN — ONDANSETRON 8 MG: 2 INJECTION INTRAMUSCULAR; INTRAVENOUS at 11:05

## 2023-06-19 RX ADMIN — SODIUM CHLORIDE, POTASSIUM CHLORIDE, SODIUM LACTATE AND CALCIUM CHLORIDE: 600; 310; 30; 20 INJECTION, SOLUTION INTRAVENOUS at 10:40

## 2023-06-19 RX ADMIN — SCOPALAMINE 1 PATCH: 1 PATCH, EXTENDED RELEASE TRANSDERMAL at 10:44

## 2023-06-19 RX ADMIN — BUPIVACAINE HYDROCHLORIDE 1.8 ML: 5 INJECTION, SOLUTION PERINEURAL at 11:00

## 2023-06-19 RX ADMIN — PHENYLEPHRINE HYDROCHLORIDE 200 MCG: 10 INJECTION INTRAVENOUS at 10:37

## 2023-06-19 RX ADMIN — PHENYLEPHRINE HYDROCHLORIDE 100 MCG: 10 INJECTION INTRAVENOUS at 11:25

## 2023-06-19 NOTE — ANESTHESIA PROCEDURE NOTES
Spinal Block    Pre-sedation assessment completed: 6/19/2023 10:58 AM    Patient reassessed immediately prior to procedure    Patient location during procedure: OR  Start Time: 6/19/2023 11:00 AM  Stop Time: 6/19/2023 11:02 AM  Indication:at surgeon's request and post-op pain management  Performed By  CRNA/CAA: Kamran Woo CRNA  Preanesthetic Checklist  Completed: patient identified, IV checked, site marked, risks and benefits discussed, surgical consent, monitors and equipment checked, pre-op evaluation and timeout performed  Spinal Block Prep:  Patient Position:sitting  Sterile Tech:cap, gloves, mask and sterile barriers  Prep:Chloraprep  Patient Monitoring:blood pressure monitoring, continuous pulse oximetry and EKG    Spinal Block Procedure  Approach:midline  Guidance:landmark technique and palpation technique  Location:L3-L4  Needle Type:Sprotte  Needle Gauge:25 G  Placement of Spinal needle event:cerebrospinal fluid aspirated  Paresthesia: no  Fluid Appearance:clear  Medications: bupivacaine (MARCAINE) injection 0.5% - Injection   1.8 mL - 6/19/2023 11:00:00 AM   Post Assessment  Patient Tolerance:patient tolerated the procedure well with no apparent complications  Complications no  Additional Notes  Added 150 mcg morphine and 20 mcg of fentanyl to spinal

## 2023-06-19 NOTE — ANESTHESIA POSTPROCEDURE EVALUATION
Patient: Syl Patel    Procedure Summary       Date: 23 Room / Location: [facility] LABOR DELIVERY  [facility] LABOR DELIVERY    Anesthesia Start: 1055 Anesthesia Stop: 1209    Procedure:  SECTION REPEAT WITH TUBAL (Abdomen) Diagnosis:       Previous  section      Request for sterilization      (Previous  section [Z98.891])      (Request for sterilization [Z30.2])    Surgeons: Liam Dang MD Provider: Kamran Woo CRNA    Anesthesia Type: spinal ASA Status: 2            Anesthesia Type: spinal    Vitals  Vitals Value Taken Time   BP 98/54 23 1328   Temp 97.8 °F (36.6 °C) 23 1215   Pulse 78 23 1328   Resp 16 23 1300   SpO2 99 % 23 1300   Vitals shown include unvalidated device data.        Post Anesthesia Care and Evaluation    Patient location during evaluation: bedside  Patient participation: complete - patient participated  Level of consciousness: awake and alert  Pain score: 0  Pain management: adequate    Airway patency: patent  Anesthetic complications: No anesthetic complications  PONV Status: none  Cardiovascular status: acceptable  Respiratory status: acceptable  Hydration status: acceptable  No anesthesia care post op

## 2023-06-19 NOTE — ANESTHESIA PREPROCEDURE EVALUATION
Anesthesia Evaluation     NPO Solid Status: > 8 hours  NPO Liquid Status: > 8 hours           Airway   Mallampati: III  TM distance: <3 FB  Neck ROM: full  No difficulty expected  Dental - normal exam   (+) edentulous    Pulmonary - normal exam   (+) a smoker (0.5ppd) Current,  Cardiovascular - normal exam        Neuro/Psych  GI/Hepatic/Renal/Endo    (+) thyroid problem     Musculoskeletal     Abdominal  - normal exam   Substance History      OB/GYN    (+) Pregnant        Other                      Anesthesia Plan    ASA 2     spinal       Anesthetic plan, risks, benefits, and alternatives have been provided, discussed and informed consent has been obtained with: patient.    Use of blood products discussed with patient  Consented to blood products.      CODE STATUS:

## 2023-06-20 PROBLEM — O41.8X10 SUBCHORIONIC HEMATOMA IN FIRST TRIMESTER: Status: RESOLVED | Noted: 2021-06-03 | Resolved: 2023-06-20

## 2023-06-20 PROBLEM — O21.9 NAUSEA AND VOMITING DURING PREGNANCY PRIOR TO 22 WEEKS GESTATION: Status: RESOLVED | Noted: 2020-09-15 | Resolved: 2023-06-20

## 2023-06-20 PROBLEM — E04.9 ENLARGED THYROID: Status: RESOLVED | Noted: 2021-06-24 | Resolved: 2023-06-20

## 2023-06-20 PROBLEM — O46.8X1 SUBCHORIONIC HEMATOMA IN FIRST TRIMESTER: Status: RESOLVED | Noted: 2021-06-03 | Resolved: 2023-06-20

## 2023-06-20 PROBLEM — Z98.891 PREVIOUS CESAREAN SECTION: Status: RESOLVED | Noted: 2023-05-19 | Resolved: 2023-06-20

## 2023-06-20 PROBLEM — O44.00 PLACENTA PREVIA ANTEPARTUM: Status: RESOLVED | Noted: 2023-02-07 | Resolved: 2023-06-20

## 2023-06-20 PROBLEM — O43.119 ANTEPARTUM PLACENTA CIRCUMVALLATA: Status: RESOLVED | Noted: 2023-03-30 | Resolved: 2023-06-20

## (undated) DEVICE — BOWL PLSTC LG 32OZ BLU STRL

## (undated) DEVICE — GLV SURG SENSICARE W/ALOE PF LF 7.5 STRL

## (undated) DEVICE — TRY SKINPREP DRYPREP

## (undated) DEVICE — SUT VIC 0 CTX 36IN J978H

## (undated) DEVICE — SUCTION CANISTER, 1000CC,SAFELINER: Brand: DEROYAL

## (undated) DEVICE — HYDROGEL COATED LATEX URINE METER FOLEY TRAY,16 FR/CH (5.3 MM), 5 ML CATHETER PRE-CONNECTED TO 2000 ML DRAINAGE BAG WITH NEEDLE SAMPLING: Brand: DOVER

## (undated) DEVICE — LAG PERI GYN: Brand: MEDLINE INDUSTRIES, INC.

## (undated) DEVICE — APPL CHLORAPREP W/TINT 26ML ORNG

## (undated) DEVICE — TUBING, SUCTION, 1/4" X 12', STRAIGHT: Brand: MEDLINE

## (undated) DEVICE — PREP SOL POVIDONE/IODINE BT 4OZ

## (undated) DEVICE — GLV SURG NEOLON 2G PF LF 6.5 STRL

## (undated) DEVICE — ANTIBACTERIAL UNDYED BRAIDED (POLYGLACTIN 910), SYNTHETIC ABSORBABLE SUTURE: Brand: COATED VICRYL

## (undated) DEVICE — PK C/SECT 40

## (undated) DEVICE — MEDI-VAC YANK SUCT HNDL W/TPRD BULBOUS TIP: Brand: CARDINAL HEALTH

## (undated) DEVICE — SOL IRR H2O BTL 1000ML STRL

## (undated) DEVICE — SUT VIC PLSTC UD PS2 4/0 27IN J426